# Patient Record
Sex: MALE | Race: ASIAN | NOT HISPANIC OR LATINO | Employment: OTHER | URBAN - METROPOLITAN AREA
[De-identification: names, ages, dates, MRNs, and addresses within clinical notes are randomized per-mention and may not be internally consistent; named-entity substitution may affect disease eponyms.]

---

## 2018-02-14 ENCOUNTER — RECORDS - HEALTHEAST (OUTPATIENT)
Dept: LAB | Facility: CLINIC | Age: 78
End: 2018-02-14

## 2018-02-14 LAB
ALBUMIN SERPL-MCNC: 3.8 G/DL (ref 3.5–5)
ALP SERPL-CCNC: 74 U/L (ref 45–120)
ALT SERPL W P-5'-P-CCNC: 17 U/L (ref 0–45)
ANION GAP SERPL CALCULATED.3IONS-SCNC: 7 MMOL/L (ref 5–18)
AST SERPL W P-5'-P-CCNC: 15 U/L (ref 0–40)
BILIRUB SERPL-MCNC: 1 MG/DL (ref 0–1)
BUN SERPL-MCNC: 30 MG/DL (ref 8–28)
CALCIUM SERPL-MCNC: 8.8 MG/DL (ref 8.5–10.5)
CHLORIDE BLD-SCNC: 104 MMOL/L (ref 98–107)
CHOLEST SERPL-MCNC: 111 MG/DL
CO2 SERPL-SCNC: 26 MMOL/L (ref 22–31)
CREAT SERPL-MCNC: 1.63 MG/DL (ref 0.7–1.3)
FASTING STATUS PATIENT QL REPORTED: ABNORMAL
GFR SERPL CREATININE-BSD FRML MDRD: 41 ML/MIN/1.73M2
GLUCOSE BLD-MCNC: 458 MG/DL (ref 70–125)
HDLC SERPL-MCNC: 36 MG/DL
LDLC SERPL CALC-MCNC: 55 MG/DL
POTASSIUM BLD-SCNC: 5.2 MMOL/L (ref 3.5–5)
PROT SERPL-MCNC: 7.2 G/DL (ref 6–8)
SODIUM SERPL-SCNC: 137 MMOL/L (ref 136–145)
TRIGL SERPL-MCNC: 98 MG/DL

## 2019-01-03 ENCOUNTER — RECORDS - HEALTHEAST (OUTPATIENT)
Dept: LAB | Facility: CLINIC | Age: 79
End: 2019-01-03

## 2019-01-03 LAB
ANION GAP SERPL CALCULATED.3IONS-SCNC: 11 MMOL/L (ref 5–18)
BUN SERPL-MCNC: 24 MG/DL (ref 8–28)
CALCIUM SERPL-MCNC: 9.5 MG/DL (ref 8.5–10.5)
CHLORIDE BLD-SCNC: 104 MMOL/L (ref 98–107)
CHOLEST SERPL-MCNC: 133 MG/DL
CO2 SERPL-SCNC: 26 MMOL/L (ref 22–31)
CREAT SERPL-MCNC: 1.54 MG/DL (ref 0.7–1.3)
FASTING STATUS PATIENT QL REPORTED: ABNORMAL
GFR SERPL CREATININE-BSD FRML MDRD: 44 ML/MIN/1.73M2
GLUCOSE BLD-MCNC: 290 MG/DL (ref 70–125)
HDLC SERPL-MCNC: 36 MG/DL
LDLC SERPL CALC-MCNC: 40 MG/DL
POTASSIUM BLD-SCNC: 4.4 MMOL/L (ref 3.5–5)
SODIUM SERPL-SCNC: 141 MMOL/L (ref 136–145)
TRIGL SERPL-MCNC: 285 MG/DL

## 2019-09-09 ENCOUNTER — TRANSFERRED RECORDS (OUTPATIENT)
Dept: HEALTH INFORMATION MANAGEMENT | Facility: CLINIC | Age: 79
End: 2019-09-09

## 2019-09-09 ENCOUNTER — OFFICE VISIT (OUTPATIENT)
Dept: PHARMACY | Facility: PHYSICIAN GROUP | Age: 79
End: 2019-09-09
Payer: COMMERCIAL

## 2019-09-09 VITALS
HEIGHT: 62 IN | WEIGHT: 141 LBS | HEART RATE: 80 BPM | DIASTOLIC BLOOD PRESSURE: 60 MMHG | SYSTOLIC BLOOD PRESSURE: 114 MMHG | BODY MASS INDEX: 25.95 KG/M2

## 2019-09-09 DIAGNOSIS — E11.8 TYPE 2 DIABETES MELLITUS WITH COMPLICATION, UNSPECIFIED WHETHER LONG TERM INSULIN USE: Primary | ICD-10-CM

## 2019-09-09 DIAGNOSIS — E78.5 HYPERLIPIDEMIA LDL GOAL <100: ICD-10-CM

## 2019-09-09 DIAGNOSIS — M54.9 BACK PAIN, UNSPECIFIED BACK LOCATION, UNSPECIFIED BACK PAIN LATERALITY, UNSPECIFIED CHRONICITY: ICD-10-CM

## 2019-09-09 DIAGNOSIS — I10 BENIGN ESSENTIAL HYPERTENSION: ICD-10-CM

## 2019-09-09 PROCEDURE — 99607 MTMS BY PHARM ADDL 15 MIN: CPT | Performed by: PHARMACIST

## 2019-09-09 PROCEDURE — 99605 MTMS BY PHARM NP 15 MIN: CPT | Performed by: PHARMACIST

## 2019-09-09 RX ORDER — METFORMIN HYDROCHLORIDE 750 MG/1
750 TABLET, EXTENDED RELEASE ORAL 2 TIMES DAILY WITH MEALS
COMMUNITY

## 2019-09-09 RX ORDER — LOSARTAN POTASSIUM 25 MG/1
12.5 TABLET ORAL DAILY
COMMUNITY
End: 2022-08-29

## 2019-09-09 RX ORDER — GLIPIZIDE 10 MG/1
10 TABLET ORAL
COMMUNITY
End: 2022-04-04 | Stop reason: ALTCHOICE

## 2019-09-09 RX ORDER — ASPIRIN 81 MG/1
81 TABLET ORAL DAILY
COMMUNITY

## 2019-09-09 RX ORDER — PRAVASTATIN SODIUM 20 MG
20 TABLET ORAL DAILY
COMMUNITY

## 2019-09-09 RX ORDER — ACETAMINOPHEN 500 MG
1000 TABLET ORAL EVERY 6 HOURS PRN
COMMUNITY
End: 2022-04-04

## 2019-09-09 ASSESSMENT — MIFFLIN-ST. JEOR: SCORE: 1233.82
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
## 2019-09-09 NOTE — PATIENT INSTRUCTIONS
Recommendations from today's MTM visit:                                                    MTM (medication therapy management) is a service provided by a clinical pharmacist designed to help you get the most of out of your medicines.   Today we reviewed what your medicines are for, how to know if they are working, that your medicines are safe and how to make your medicine regimen as easy as possible.     1)  I'm going to talk to Dr. Soni about this first, but I'm recommending that we decrease the Metformin 500 mg tablets to one tablet twice.  Dee is currently taking two tablets twice daily, but we may reduce this to one tablet twice daily    2)  If we do change the metformin, we will likely need to increase the dose of Lantus to 22 units twice daily.  I will let you know once I confirm this with Dr. Soni    It was great to speak with you today.  I value your experience and would be very thankful for your time with providing feedback on our clinic survey. You may receive a survey via email or text message in the next few days.     Next MTM visit: 6 weeks, after appointment with Dr. Soni    To schedule another MTM appointment, please call the clinic directly or you may call the MTM scheduling line at [phone] or toll-free at [phone].     My Clinical Pharmacist's contact information:                                                      It was a pleasure talking with you today!  Please feel free to contact me with any questions or concerns you have.      Magy Medina, PharmD  Medication Therapy Management Pharmacist  Pager: [phone]           Medication List           Accurate as of 9/9/19 11:23 AM. If you have any questions, ask your nurse or doctor.               CONTINUE taking these medications      Morning Afternoon Evening Bedtime   acetaminophen 500 MG tablet  Also known as:  TYLENOL  Take 1,000 mg by mouth every 6 hours as needed for mild pain                  aspirin 81 MG EC  tablet  Take 81 mg by mouth daily              glipiZIDE 10 MG tablet  Also known as:  GLUCOTROL  Take 10 mg by mouth 2 times daily (before meals)        1 tablet with food      1 tablet with food      insulin glargine 100 UNIT/ML pen  Also known as:  LANTUS PEN  Inject 20 Units Subcutaneous 2 times daily  Doctor's comments:  If Lantus is not covered by insurance, may substitute Basaglar at same dose and frequency.                  losartan 25 MG tablet  Also known as:  COZAAR  Take 25 mg by mouth daily              metFORMIN 500 MG 24 hr tablet  Also known as:  GLUCOPHAGE-XR  Take 1,000 mg by mouth daily (with dinner)        2 tablets with food      2 tablets with food      pravastatin 20 MG tablet  Also known as:  PRAVACHOL  Take 20 mg by mouth daily

## 2020-01-20 ENCOUNTER — RECORDS - HEALTHEAST (OUTPATIENT)
Dept: LAB | Facility: CLINIC | Age: 80
End: 2020-01-20

## 2020-01-20 LAB
ANION GAP SERPL CALCULATED.3IONS-SCNC: 10 MMOL/L (ref 5–18)
BUN SERPL-MCNC: 30 MG/DL (ref 8–28)
CALCIUM SERPL-MCNC: 9.7 MG/DL (ref 8.5–10.5)
CHLORIDE BLD-SCNC: 102 MMOL/L (ref 98–107)
CHOLEST SERPL-MCNC: 154 MG/DL
CO2 SERPL-SCNC: 26 MMOL/L (ref 22–31)
CREAT SERPL-MCNC: 1.79 MG/DL (ref 0.7–1.3)
FASTING STATUS PATIENT QL REPORTED: ABNORMAL
GFR SERPL CREATININE-BSD FRML MDRD: 37 ML/MIN/1.73M2
GLUCOSE BLD-MCNC: 448 MG/DL (ref 70–125)
HDLC SERPL-MCNC: 39 MG/DL
LDLC SERPL CALC-MCNC: 63 MG/DL
POTASSIUM BLD-SCNC: 5.4 MMOL/L (ref 3.5–5)
SODIUM SERPL-SCNC: 138 MMOL/L (ref 136–145)
TRIGL SERPL-MCNC: 260 MG/DL

## 2020-12-09 ENCOUNTER — RECORDS - HEALTHEAST (OUTPATIENT)
Dept: LAB | Facility: CLINIC | Age: 80
End: 2020-12-09

## 2020-12-09 LAB
ANION GAP SERPL CALCULATED.3IONS-SCNC: 12 MMOL/L (ref 5–18)
BUN SERPL-MCNC: 27 MG/DL (ref 8–28)
CALCIUM SERPL-MCNC: 9.3 MG/DL (ref 8.5–10.5)
CHLORIDE BLD-SCNC: 102 MMOL/L (ref 98–107)
CO2 SERPL-SCNC: 24 MMOL/L (ref 22–31)
CREAT SERPL-MCNC: 1.86 MG/DL (ref 0.7–1.3)
GFR SERPL CREATININE-BSD FRML MDRD: 35 ML/MIN/1.73M2
GLUCOSE BLD-MCNC: 451 MG/DL (ref 70–125)
POTASSIUM BLD-SCNC: 4.8 MMOL/L (ref 3.5–5)
SODIUM SERPL-SCNC: 138 MMOL/L (ref 136–145)

## 2020-12-23 ENCOUNTER — RECORDS - HEALTHEAST (OUTPATIENT)
Dept: LAB | Facility: CLINIC | Age: 80
End: 2020-12-23

## 2020-12-23 LAB
ANION GAP SERPL CALCULATED.3IONS-SCNC: 8 MMOL/L (ref 5–18)
BUN SERPL-MCNC: 31 MG/DL (ref 8–28)
CALCIUM SERPL-MCNC: 9.2 MG/DL (ref 8.5–10.5)
CHLORIDE BLD-SCNC: 106 MMOL/L (ref 98–107)
CO2 SERPL-SCNC: 27 MMOL/L (ref 22–31)
CREAT SERPL-MCNC: 1.71 MG/DL (ref 0.7–1.3)
GFR SERPL CREATININE-BSD FRML MDRD: 39 ML/MIN/1.73M2
GLUCOSE BLD-MCNC: 249 MG/DL (ref 70–125)
POTASSIUM BLD-SCNC: 5.1 MMOL/L (ref 3.5–5)
SODIUM SERPL-SCNC: 141 MMOL/L (ref 136–145)

## 2021-03-10 ENCOUNTER — RECORDS - HEALTHEAST (OUTPATIENT)
Dept: LAB | Facility: CLINIC | Age: 81
End: 2021-03-10

## 2021-03-10 LAB
CHOLEST SERPL-MCNC: 150 MG/DL
FASTING STATUS PATIENT QL REPORTED: ABNORMAL
HDLC SERPL-MCNC: 33 MG/DL
LDLC SERPL CALC-MCNC: 68 MG/DL
TRIGL SERPL-MCNC: 243 MG/DL

## 2021-09-22 ENCOUNTER — LAB REQUISITION (OUTPATIENT)
Dept: LAB | Facility: CLINIC | Age: 81
End: 2021-09-22

## 2021-09-22 DIAGNOSIS — E11.9 TYPE 2 DIABETES MELLITUS WITHOUT COMPLICATIONS (H): ICD-10-CM

## 2021-09-22 LAB
ANION GAP SERPL CALCULATED.3IONS-SCNC: 11 MMOL/L (ref 5–18)
BUN SERPL-MCNC: 24 MG/DL (ref 8–28)
CALCIUM SERPL-MCNC: 9.3 MG/DL (ref 8.5–10.5)
CHLORIDE BLD-SCNC: 104 MMOL/L (ref 98–107)
CO2 SERPL-SCNC: 25 MMOL/L (ref 22–31)
CREAT SERPL-MCNC: 1.66 MG/DL (ref 0.7–1.3)
CREAT UR-MCNC: 65 MG/DL
GFR SERPL CREATININE-BSD FRML MDRD: 38 ML/MIN/1.73M2
GLUCOSE BLD-MCNC: 363 MG/DL (ref 70–125)
MICROALBUMIN UR-MCNC: 3.65 MG/DL (ref 0–1.99)
MICROALBUMIN/CREAT UR: 56.2 MG/G CR
POTASSIUM BLD-SCNC: 4.5 MMOL/L (ref 3.5–5)
SODIUM SERPL-SCNC: 140 MMOL/L (ref 136–145)

## 2021-09-22 PROCEDURE — 36415 COLL VENOUS BLD VENIPUNCTURE: CPT | Performed by: FAMILY MEDICINE

## 2021-09-22 PROCEDURE — 80048 BASIC METABOLIC PNL TOTAL CA: CPT | Performed by: FAMILY MEDICINE

## 2021-09-22 PROCEDURE — 82043 UR ALBUMIN QUANTITATIVE: CPT | Performed by: FAMILY MEDICINE

## 2021-12-14 ENCOUNTER — TRANSFERRED RECORDS (OUTPATIENT)
Dept: HEALTH INFORMATION MANAGEMENT | Facility: CLINIC | Age: 81
End: 2021-12-14

## 2021-12-14 LAB — RETINOPATHY: NORMAL

## 2022-03-23 ENCOUNTER — TRANSFERRED RECORDS (OUTPATIENT)
Dept: HEALTH INFORMATION MANAGEMENT | Facility: CLINIC | Age: 82
End: 2022-03-23
Payer: COMMERCIAL

## 2022-03-23 ENCOUNTER — LAB REQUISITION (OUTPATIENT)
Dept: LAB | Facility: CLINIC | Age: 82
End: 2022-03-23

## 2022-03-23 DIAGNOSIS — E11.9 TYPE 2 DIABETES MELLITUS WITHOUT COMPLICATIONS (H): ICD-10-CM

## 2022-03-23 DIAGNOSIS — E78.2 MIXED HYPERLIPIDEMIA: ICD-10-CM

## 2022-03-23 LAB
ALBUMIN SERPL-MCNC: 3.8 G/DL (ref 3.5–5)
ALP SERPL-CCNC: 78 U/L (ref 45–120)
ALT SERPL W P-5'-P-CCNC: 13 U/L (ref 0–45)
ANION GAP SERPL CALCULATED.3IONS-SCNC: 13 MMOL/L (ref 5–18)
AST SERPL W P-5'-P-CCNC: 16 U/L (ref 0–40)
BILIRUB SERPL-MCNC: 0.9 MG/DL (ref 0–1)
BUN SERPL-MCNC: 26 MG/DL (ref 8–28)
CALCIUM SERPL-MCNC: 8.9 MG/DL (ref 8.5–10.5)
CHLORIDE BLD-SCNC: 102 MMOL/L (ref 98–107)
CHOLEST SERPL-MCNC: 114 MG/DL
CO2 SERPL-SCNC: 22 MMOL/L (ref 22–31)
CREAT SERPL-MCNC: 1.75 MG/DL (ref 0.7–1.3)
GFR SERPL CREATININE-BSD FRML MDRD: 38 ML/MIN/1.73M2
GLUCOSE BLD-MCNC: 378 MG/DL (ref 70–125)
HBA1C MFR BLD: 10 % (ref 4.2–6.1)
HDLC SERPL-MCNC: 30 MG/DL
LDLC SERPL CALC-MCNC: 48 MG/DL
POTASSIUM BLD-SCNC: 5 MMOL/L (ref 3.5–5)
PROT SERPL-MCNC: 6.8 G/DL (ref 6–8)
SODIUM SERPL-SCNC: 137 MMOL/L (ref 136–145)
TRIGL SERPL-MCNC: 181 MG/DL

## 2022-03-23 PROCEDURE — 80053 COMPREHEN METABOLIC PANEL: CPT | Performed by: FAMILY MEDICINE

## 2022-03-23 PROCEDURE — 80061 LIPID PANEL: CPT | Performed by: FAMILY MEDICINE

## 2022-04-01 ENCOUNTER — OFFICE VISIT (OUTPATIENT)
Dept: PHARMACY | Facility: PHYSICIAN GROUP | Age: 82
End: 2022-04-01
Payer: COMMERCIAL

## 2022-04-01 VITALS — SYSTOLIC BLOOD PRESSURE: 130 MMHG | DIASTOLIC BLOOD PRESSURE: 64 MMHG | HEART RATE: 72 BPM

## 2022-04-01 DIAGNOSIS — Z79.4 TYPE 2 DIABETES MELLITUS WITHOUT COMPLICATION, WITH LONG-TERM CURRENT USE OF INSULIN (H): Primary | ICD-10-CM

## 2022-04-01 DIAGNOSIS — Z78.9 TAKES DIETARY SUPPLEMENTS: ICD-10-CM

## 2022-04-01 DIAGNOSIS — K59.00 CONSTIPATION, UNSPECIFIED CONSTIPATION TYPE: ICD-10-CM

## 2022-04-01 DIAGNOSIS — G62.9 NEUROPATHY: ICD-10-CM

## 2022-04-01 DIAGNOSIS — I11.9 HYPERTENSIVE HEART DISEASE WITHOUT HEART FAILURE: ICD-10-CM

## 2022-04-01 DIAGNOSIS — E11.9 TYPE 2 DIABETES MELLITUS WITHOUT COMPLICATION, WITH LONG-TERM CURRENT USE OF INSULIN (H): Primary | ICD-10-CM

## 2022-04-01 DIAGNOSIS — E78.5 HYPERLIPIDEMIA LDL GOAL <100: ICD-10-CM

## 2022-04-01 PROCEDURE — 99607 MTMS BY PHARM ADDL 15 MIN: CPT | Performed by: PHARMACIST

## 2022-04-01 PROCEDURE — 99605 MTMS BY PHARM NP 15 MIN: CPT | Performed by: PHARMACIST

## 2022-04-01 NOTE — LETTER
April 4, 2022  Dee Sheikh  [address] [place] [address]  [place] [facility] [address]    Dear Mr. Sheikh, [facility]        Thank you for talking with me on Apr 1, 2022 about your health and medications. As a follow-up to our conversation, I have included two documents:      1. Your Recommended To-Do List has steps you should take to get the best results from your medications.  2. Your Medication List will help you keep track of your medications and how to take them.    If you want to talk about these documents, please call Magy Medina RPH at phone: [phone], Monday-Friday 8-4:30pm.    I look forward to working with you and your doctors to make sure your medications work well for you.    Sincerely,    Magy Medina RPH  [facility] Pharmacist, [facility]

## 2022-04-01 NOTE — LETTER
_  Medication List        Prepared on: 4/4/2022     Bring your Medication List when you go to the doctor, hospital, or   emergency room. And, share it with your family or caregivers.     Note any changes to how you take your medications.  Cross out medications when you no longer use them.    Medication How I take it Why I use it Prescriber   acetaminophen (ACETAMINOPHEN 8 HOUR) 650 MG CR tablet Take 650 mg by mouth every 8 hours as needed for pain Pain Cathy Caballero MD   aspirin 81 MG EC tablet Take 81 mg by mouth daily  To Prevent Heart Attack and Stroke Cathy Caballero MD   dulaglutide (TRULICITY) 0.75 MG/0.5ML pen Inject 0.75 mg Subcutaneous every 7 days Type 2 Diabetes Cathy Caballero MD   gabapentin (NEURONTIN) 100 MG capsule Take 100 mg by mouth 3 times daily Neuropathic Pain Cathy Caballero MD   insulin glargine (LANTUS PEN) 100 UNIT/ML pen Inject 30 Units Subcutaneous 2 times daily  Type 2 Diabetes Cathy Caballero MD   losartan (COZAAR) 25 MG tablet Take 12.5 mg by mouth daily  High Blood Pressure  Cathy Caballero MD   metFORMIN (GLUCOPHAGE-XR) 750 MG 24 hr tablet Take 750 mg by mouth 2 times daily (with meals)  Type 2 Diabetes Cathy Caballero MD   Multiple Vitamins-Minerals (MULTIVITAMIN ADULTS) Take 1 tablet by mouth daily General Health Cathy Caballero MD   polyethylene glycol (MIRALAX) 17 GM/Dose powder Take 1 capful by mouth daily as needed for constipation  Constipation Cathy Caballero MD   pravastatin (PRAVACHOL) 20 MG tablet Take 20 mg by mouth daily  High Cholesterol Cathy Caballero MD         Add new medications, over-the-counter drugs, herbals, vitamins, or  minerals in the blank rows below.    Medication How I take it Why I use it Prescriber                          Allergies:      No Known Allergies        Side effects I have had:              Other Information:             My notes and questions:

## 2022-04-01 NOTE — PROGRESS NOTES
Medication Therapy Management (MTM) Encounter    ASSESSMENT:                            Medication Adherence/Access: No issues identified    Type 2 Diabetes: Patient is not meeting A1c goal of < 8%. Self monitoring of blood glucose is not at goal of fasting  mg/dL. Patient would benefit from starting Trulicity as discussed with dr. Caballero.  With the recent GI upset, nausea, and vomiting, discussed that he should wait 3-5 days until feeling better and then can start Trulicity.  At that time he will stop glipizide.  He agrees to this and will let us know if nausea/vomiting still occurring.  Would benefit from CGM, didn't have time to discuss today.    Hypertension: Stable. Patient is meeting blood pressure goal of < 140/90mmHg.    Hyperlipidemia: Patient is not on moderate intensity statin which is indicated based on 2019 ACC/AHA guidelines for lipid management.  His LDL was 48 last time, wouldn't recommend increasing the pravastatin dose or changing medications.    Neuropathy:  Stable.  Increase in symptoms likely from high A1c.    Constipation:  Stable.    Supplements/OTCs: Stable.    PLAN:                            1.  Education provided on how to use the Trulicity injection.  He will start in a few days when nausea/vomiting resolved.  Will stop glipizide at that time.    2.  I will refill metformin.    3.  Discussed trying to drink Pedialyte  ml every 2-4 times daily as able.  If unable to keep down or severe nausea continues should call us back.     Follow-up: Return in 19 days (on 4/20/2022) for MTM Follow Up, Diabetes Follow Up.    SUBJECTIVE/OBJECTIVE:                          Dee Sheikh is a 82 year old male called for an initial visit. He was referred to me from Dr. Caballero. Patient was accompanied by  Valentine.     Reason for visit: Initial medication review. Patient coming in today to learn how to use the Trulicity injection.      Allergies/ADRs: Reviewed in chart  Past Medical History:  Reviewed in chart  Tobacco: He reports that he has never smoked. He does not have any smokeless tobacco history on file.  Alcohol: not currently using    Medication Adherence/Access: no issues reported  Has nurse that set up his medications into a pillbox, he takes it how she sets it up. Rarely forgets doses.    Fills medications at [facility] pharmacy in [place].    HPI:  Pt reports he hasn't felt good the last few days.  Pt felt very dehydrated, headaches, feeling a bit sick, was vomiting yesterday.  Today he feels a bit nauseated and dizzy but hasn't vomited.  Has been able to drink water today.    Type 2 Diabetes:  Currently taking Lantus 30 units twice daily, Glipizide 10 mg once daily (will be stopping once Trulicity started), and Metformin  mg twice daily. Patient is not experiencing side effects.  Blood sugar monitorin time(s) daily. Ranges (patient reported):   BG this morning 238  Am fasting usually 170 or higher  (didn't have glucometer with)  Symptoms of low blood sugar? shaky, dizzy, weak, hungry Frequency of lows- rare  Symptoms of high blood sugar? fatigue, nausea and vomiting  Eye exam: up to date  Foot exam: up to date  Diet/Exercise: Not discussed in detail due to time (apt not scheduled for long enough visit.)  Aspirin: Taking 81mg daily and denies side effects  Statin: Yes   ACEi/ARB: Yes.   Urine Albumin: elevated, UTD  Last A1c was 10%    Hypertension: Current medications include Losartan 12.5 mg daily.  Patient does not self-monitor blood pressure.  Patient reports no current medication side effects.  BP Readings from Last 3 Encounters:   22 130/64   19 114/60     Hyperlipidemia: Current therapy includes Pravastatin 20 mg daily.  Patient reports no significant myalgias or other side effects.    Recent Labs   Lab Test 22  0928 03/10/21  0937   CHOL 114 150   HDL 30* 33*   LDL 48 68   TRIG 181* 243*     Neuropathy: Taking Gabapentin 100 mg twice daily, and  sometimes uses the third dose midday if needed for pain.  Said he has been noticing a bit more tingling/numbness in his feet lately.  The gabapentin does help a lot.  Denies feeling groggy or dizzy after doses.    Constipation:  Takes miralax 17 g daily, reports this is helpful.  Having more regular bowel movements.    Supplements/OTCs: Taking multivitamin daily. No issues with this. Also uses acetaminophen  mg as needed for pain.  Not discussed in detail.      Today's Vitals: /64   Pulse 72   ----------------      I spent 30 minutes with this patient today (an extra 15 minutes was spent creating the Medication Action Plan). All changes were made via collaborative practice agreement with No primary care provider on file.. A copy of the visit note was provided to the patient's provider(s).    The patient was given a summary of these recommendations.     Magy Medina, PharmD  Medication Therapy Management Pharmacist  Pager: [phone]     Medication Therapy Recommendations  Type 2 diabetes mellitus without complication, with long-term current use of insulin (H)    Current Medication: dulaglutide (TRULICITY) 0.75 MG/0.5ML pen   Rationale: Does not understand instructions - Adherence - Adherence   Recommendation: Provide Education   Status: Accepted per CPA

## 2022-04-01 NOTE — LETTER
"Recommended To-Do List      Prepared on: 4/4/2022     You can get the best results from your medications by completing the items on this \"To-Do List.\"      Bring your To-Do List when you go to your doctor. And, share it with your family or caregivers.    My To-Do List:  What we talked about: What I should do:   We reviewed how to use the Trulicity pen.    1.  Once you are not having nausea/vomting you can start Trulicity.  I would recommend waiting 3-4 days until feeling better.  Then yo can start Trulicity 0.75 mg once weekly.  Stop glipizide once you start Trulicity.    2.  I sent in a refill for the metformin.    3.   Try to drink Pedialyte  ml every 2-4 times daily as able. Start with small sips, and increase if able.  If unable to keep down or having severe nausea/vomiting continues should call us back or go to the ER over the weekend.      Follow-up: Return in 19 days (on 4/20/2022) for MTM Follow Up, Diabetes Follow Up.            What we talked about: What I should do:                     "

## 2022-04-04 RX ORDER — POLYETHYLENE GLYCOL 3350 17 G/17G
1 POWDER, FOR SOLUTION ORAL DAILY PRN
COMMUNITY

## 2022-04-04 RX ORDER — GABAPENTIN 100 MG/1
100 CAPSULE ORAL 3 TIMES DAILY
COMMUNITY

## 2022-04-04 RX ORDER — SENNOSIDES 8.6 MG
650 CAPSULE ORAL EVERY 8 HOURS PRN
COMMUNITY

## 2022-04-20 ENCOUNTER — OFFICE VISIT (OUTPATIENT)
Dept: PHARMACY | Facility: PHYSICIAN GROUP | Age: 82
End: 2022-04-20
Payer: COMMERCIAL

## 2022-04-20 DIAGNOSIS — Z79.4 TYPE 2 DIABETES MELLITUS WITHOUT COMPLICATION, WITH LONG-TERM CURRENT USE OF INSULIN (H): Primary | ICD-10-CM

## 2022-04-20 DIAGNOSIS — E11.9 TYPE 2 DIABETES MELLITUS WITHOUT COMPLICATION, WITH LONG-TERM CURRENT USE OF INSULIN (H): Primary | ICD-10-CM

## 2022-04-20 PROCEDURE — 99607 MTMS BY PHARM ADDL 15 MIN: CPT | Performed by: PHARMACIST

## 2022-04-20 PROCEDURE — 99606 MTMS BY PHARM EST 15 MIN: CPT | Performed by: PHARMACIST

## 2022-04-20 NOTE — PROGRESS NOTES
Medication Therapy Management (MTM) Encounter    ASSESSMENT:                            Medication Adherence/Access: See below    Difficulty Hearing (right ear):  Confirmed that the fluticasone nasal spray was covered, and it's being shipped to him as of 4/21.  Discussed that he should NOT put the fluticasone nasal spray into his ear.  The nasal spray he is using at home should also NOT be put into his ear.       Type 2 Diabetes: Patient is not meeting A1c goal of < 8%. Self monitoring of blood glucose is not at goal of fasting  mg/dL but improving.  Has been tolerating Trulicity well, BG are coming down very quickly and not having some hypoglycemia overnight.  Would benefit from reducin his Lantus dose.  OK to continue the lower dose just at bedtime since this is what he's been taking the last month and BG seem to be improving. We may be able to titrate his dose of Trulicity, and get off Lantus completely.  Would benefit from CGM, He would like to get the Amina monitor.       PLAN:                            1.  Decrease Lantus to 25 units at bedtime.    2.  Start Freestyle Amina 2 reader and sensors.  Sending prescription in.  We will review how to use this on 5/4.    I checked with the pharmacy, and they shipped the Fluticasone nasal spray on 4/21 so you will get this soon.    Follow-up: Return in 2 weeks (on 5/4/2022) for MTM Follow Up, Diabetes Follow Up.    SUBJECTIVE/OBJECTIVE:                          Dee Sheikh is a 82 year old male coming in for a follow-up visit. Patient was accompanied by an . Today's visit is a follow-up MTM visit from 4/1/22     Reason for visit: Follow up on diabetes and new start Trulicity.    Allergies/ADRs: Reviewed in chart  Past Medical History: Reviewed in chart  Tobacco: He reports that he has never smoked. He does not have any smokeless tobacco history on file.  Alcohol: not currently using     Medication Adherence/Access: no issues reported  Has nurse that set  up his medications into a pillbox, he takes it how she sets it up. Rarely forgets doses.    Fills medications at [facility] pharmacy in [place].    HPI:  Patient went to [facility] ED on 4/3 for nausea, vomiting and dizziness.  They felt his N/V was due to hyperglycemia.  His BG there was extremely high.  He hadn't been able to take his oral DM meds which likely exacerbated the hyperglycemia.     Difficulty Hearing (right ear): On the  or 4/3 he started having issues hearing our of right ear.  That was around the time he was very nauseated and had emesis.  He saw Dr. Shelby on .  He hasn't been able to start the fluticasone nasal spray that Dr. Shelby prescribed   He did get a nasal spray at [facility], but isn't helping. Doesn't know what kind of nasal spray it is, has been using it for 4 days.  He is using it in his nose and ears.     Type 2 Diabetes:  Currently taking Lantus 30 units at bedtime (was on BID), Trulicity 0.75 mg weekly (on for last 3 weeks), and Metformin  mg twice daily.  His blood sugars have been coming down a lot in the last month.  Opt said someone told him to decrease his Lantus to once daily at bedtime when he started Trulicity.  I don't know who told him this, I didn't and Dr. Caballero didn't say to decrease his insulin.  We had stopped the glipizide, but didn't have intention to reduce lantus too.  Looking at the [facility] ED note, they have the wrong insulin dosing listed for him.  It's possible they told him to continue lantus 30 units at bedtime thinking this was his PTA dose.     Discussed CGMs, he would love to get one.  Should be covered by insurance.  Patient is not experiencing side effects.  Blood sugar monitorin time(s) daily. Ranges (patient reported):   BG AM fasting 180, 191, 130 today.  Had one morning it was 68.  Had a few AM readings between 85-90  (didn't have glucometer with)  Symptoms of low blood sugar? shaky, dizzy, weak, hungry  Frequency of lows- has a few in the last month.  2 times,   Symptoms of high blood sugar? fatigue, nausea and vomiting  Eye exam: up to date  Foot exam: up to date  Diet/Exercise: Not discussed in detail due to time (apt not scheduled for long enough visit.)  Aspirin: Taking 81mg daily and denies side effects  Statin: Yes   ACEi/ARB: Yes.   Urine Albumin: elevated, UTD  Last A1c was 10%      Today's Vitals: /64   Pulse 67   ----------------  Post Discharge Medication Reconciliation Status: discharge medications reconciled and changed, per note/orders.    I spent 40 minutes with this patient today. All changes were made via collaborative practice agreement with No primary care provider on file.. A copy of the visit note was provided to the patient's provider(s).    The patient was mailed a summary of these recommendations.     Magy Medina, PharmD  Medication Therapy Management Pharmacist  Pager: [phone]       Medication Therapy Recommendations  Type 2 diabetes mellitus without complication, with long-term current use of insulin (H)    Current Medication: blood glucose monitoring (NO BRAND SPECIFIED) meter device kit   Rationale: More effective medication available - Ineffective medication - Effectiveness   Recommendation: Change Medication   Status: Accepted per CPA   Note: Freestyle Amina 2          Current Medication: insulin glargine (LANTUS PEN) 100 UNIT/ML pen   Rationale: Dose too high - Dosage too high - Safety   Recommendation: Decrease Dose   Status: Accepted per CPA

## 2022-04-22 VITALS — HEART RATE: 67 BPM | DIASTOLIC BLOOD PRESSURE: 64 MMHG | SYSTOLIC BLOOD PRESSURE: 122 MMHG

## 2022-04-22 RX ORDER — FLUTICASONE PROPIONATE 50 MCG
1 SPRAY, SUSPENSION (ML) NASAL DAILY
COMMUNITY

## 2022-04-22 NOTE — PATIENT INSTRUCTIONS
"Recommendations from today's MTM visit:                                                    MTM (medication therapy management) is a service provided by a clinical pharmacist designed to help you get the most of out of your medicines.   Today we reviewed what your medicines are for, how to know if they are working, that your medicines are safe and how to make your medicine regimen as easy as possible.      1. Decrease Lantus to 25 units at bedtime.    2. Start Freestyle Amina 2 reader and sensors.  Sending prescription in.  We will review how to use this on 5/4.    I checked with the pharmacy, and they shipped the Fluticasone nasal spray on 4/21 so you will get this soon.    Follow-up: Return in 2 weeks (on 5/4/2022) for MTM Follow Up, Diabetes Follow Up.    It was great speaking with you today.  I value your experience and would be very thankful for your time in providing feedback in our clinic survey. In the next few days, you may receive an email or text message from "[employer] with a link to a survey related to your  clinical pharmacist.\"     To schedule another MTM appointment, please call the clinic directly or you may call the MTM scheduling line at [phone] or toll-free at [phone].     My Clinical Pharmacist's contact information:                                                      Please feel free to contact me with any questions or concerns you have.      Magy Medina, Sonia  Medication Therapy Management Pharmacist  Pager: [phone]     "

## 2022-05-04 ENCOUNTER — OFFICE VISIT (OUTPATIENT)
Dept: PHARMACY | Facility: PHYSICIAN GROUP | Age: 82
End: 2022-05-04
Payer: COMMERCIAL

## 2022-05-04 ENCOUNTER — LAB REQUISITION (OUTPATIENT)
Dept: LAB | Facility: CLINIC | Age: 82
End: 2022-05-04
Payer: COMMERCIAL

## 2022-05-04 ENCOUNTER — LAB REQUISITION (OUTPATIENT)
Dept: LAB | Facility: CLINIC | Age: 82
End: 2022-05-04

## 2022-05-04 ENCOUNTER — TRANSFERRED RECORDS (OUTPATIENT)
Dept: HEALTH INFORMATION MANAGEMENT | Facility: CLINIC | Age: 82
End: 2022-05-04

## 2022-05-04 VITALS
SYSTOLIC BLOOD PRESSURE: 102 MMHG | DIASTOLIC BLOOD PRESSURE: 54 MMHG | WEIGHT: 140.9 LBS | HEART RATE: 88 BPM | BODY MASS INDEX: 25.93 KG/M2 | HEIGHT: 62 IN

## 2022-05-04 DIAGNOSIS — J02.9 ACUTE PHARYNGITIS, UNSPECIFIED: ICD-10-CM

## 2022-05-04 DIAGNOSIS — E46 PROTEIN-CALORIE MALNUTRITION, UNSPECIFIED SEVERITY (H): ICD-10-CM

## 2022-05-04 DIAGNOSIS — Z79.4 TYPE 2 DIABETES MELLITUS WITHOUT COMPLICATION, WITH LONG-TERM CURRENT USE OF INSULIN (H): Primary | ICD-10-CM

## 2022-05-04 DIAGNOSIS — H91.91 HEARING DIFFICULTY OF RIGHT EAR: ICD-10-CM

## 2022-05-04 DIAGNOSIS — E11.9 TYPE 2 DIABETES MELLITUS WITHOUT COMPLICATION, WITH LONG-TERM CURRENT USE OF INSULIN (H): Primary | ICD-10-CM

## 2022-05-04 LAB — HBA1C MFR BLD: 8.9 % (ref 4.2–6.1)

## 2022-05-04 PROCEDURE — 87077 CULTURE AEROBIC IDENTIFY: CPT | Performed by: FAMILY MEDICINE

## 2022-05-04 PROCEDURE — 99606 MTMS BY PHARM EST 15 MIN: CPT | Performed by: PHARMACIST

## 2022-05-04 PROCEDURE — U0003 INFECTIOUS AGENT DETECTION BY NUCLEIC ACID (DNA OR RNA); SEVERE ACUTE RESPIRATORY SYNDROME CORONAVIRUS 2 (SARS-COV-2) (CORONAVIRUS DISEASE [COVID-19]), AMPLIFIED PROBE TECHNIQUE, MAKING USE OF HIGH THROUGHPUT TECHNOLOGIES AS DESCRIBED BY CMS-2020-01-R: HCPCS | Mod: ORL | Performed by: FAMILY MEDICINE

## 2022-05-04 NOTE — PROGRESS NOTES
Medication Therapy Management (MTM) Encounter    ASSESSMENT:                            Medication Adherence/Access: No issues identified    Difficulty Hearing (right ear):  Hearing has improved slightly, but hasn't been on the fluticasone long.  The cough, sore throat, and sputum production could be related to allergies.  These symptoms may continue to improve in the next few weeks, but encouraged him to call us if they don't.  Could consider referral to ENT/angiologist for hearing concern.  Dizziness likely related to hearing issue, could also be lower blood pressure today.  Will ask MA to recheck blood pressure as well.    Malnutrition:  Would benefit from additional help from care coordinator.  He still hasn't been able to get the Ensure, so unsure if his [facility] coordinator got Mayela's request for help with calling pharmacies to see where it could be ordered.  If the [facility] coordinator hasn't been able to find out, Mayela could call pharmacies/medical supply companies.     Type 2 Diabetes: Patient is not meeting A1c goal of < 8%. Self monitoring of blood glucose is at goal of fasting  mg/dL.  He is checking his A1c again at his appointment today with Dr. Caballero, will defer medication changes to her visit.  Could continue increasing the dose of Trulicity if needed.    PLAN:                            1.  Continue current medications.  Seeing Dr. Caballero after for A1c and medication changes if needed.    Follow-up: as needed - Patient would like to wait 3 months before adjusting medications.  He will continue follow up with Dr. Caballero, and we can schedule follow up in between visits if needed or having difficulty getting A1c down.    After my appointment with patient Dr. Caballero stopped by to discuss medication changes.  His A1c was 8.9%, significantly improved but still high.  Discussed increasing Trulicity and decreasing Lantus dose again-Dr. Caballero will review with him at her visit.  We would like to do a phone  follow up with me in a couple weeks, I will call him.  He just didn't want to come back in to so frequently for visits in between PCP appointments.    Update 5/4 6 PM:  Dee declined changing medications, wants to wait until his follow up with Dr. Caballero in 6 weeks.  Follow up with me not needed at this time.  Dr. Caballero will let me know if further MTM follow up needed.      SUBJECTIVE/OBJECTIVE:                          Dee Sheikh is a 82 year old male coming in for a follow-up visit. Patient was accompanied by  Valentine. Patient is seeing provider after our visit today. Today's visit is a follow-up MTM visit from 4/20/2022     Reason for visit: Diabetes follow up.  We were planning to review how to use the kyler monitor today but it wasn't covered.    Allergies/ADRs: Reviewed in chart  Past Medical History: Reviewed in chart  Tobacco: He reports that he has never smoked. He has never used smokeless tobacco.  Alcohol: not currently using    Medication Adherence/Access: no issues reported    Difficulty Hearing (right ear): Taking fluticasone nasal spray 1 spray daily.  Started this 4 days ago, thinks he's had some improvement in his hearing.  On the 4/2 or 4/3 he started having issues hearing out of right ear.  That was around the time he was very nauseated and had emesis.  He went to the hospital at that time for emesis, which was related to hyperglycemia.  He saw Dr. Shelby on 4/14 for hearing issue.    He also reports sinus congestion, sputum production, cough and sore throat.  Will let Dr. Caballero know, she may want to test for influenza/COVID.  These symptoms have been occurring the last month.    Malnutrition:  Still hasn't been able to get the Ensure.  Mayela was going to ask his  to help him find a pharmacy that can order the specific brand.  He has been buying the OTC ensure that has sugar in it (7g), [facility] had it.  He didn't notice significant change in glucose readings.  Has been using it  "for the last month and didn't notice an increase in his blood sugars.     Type 2 Diabetes:  Currently taking Lantus 30 units at bedtime, Trulicity 0.75 mg weekly (on for last 4 weeks), and Metformin  mg twice daily.  At our last visit we had reduced his Lantus to 25 units due to hypoglycemia.  He noticed his blood glucose go up a lot afterwards, so resumed the previous dose of 30 units daily.  Denies hypoglycemia the last few weeks. We tried getting a kyler monitor but found out CGMs are not covered.  He has a MA/Medicare combined plan so it follows the medicare rules for CGMs.    Denies nausea/vomiting since his hospital discharge/starting trulicity.  Is still having dizziness, feels a bit off balance when walking.  Thinks it's related to the newer hearing issues.  Blood sugar monitorin time(s) daily. Ranges (patient reported):   Morning fasting blood glucose: 144 today, usually around 130-140.  (didn't have glucometer with)  Symptoms of low blood sugar? shaky, dizzy, weak, hungry Frequency of lows- none in the last month  Symptoms of high blood sugar? fatigue, improving  Eye exam: up to date  Foot exam: up to date  Aspirin: Taking 81 mg daily and denies side effects  Statin: Yes   ACEi/ARB: Yes.   Urine Albumin: elevated, up to date.  On max tolerated dose of losartan.  Abstracted A1c result-  Lab Results   Component Value Date    60857 10.0 (A) 2022       Today's Vitals: /54   Pulse 88   Ht 5' 2\" (1.575 m)   Wt 140 lb 14.4 oz (63.9 kg)   BMI 25.77 kg/m    ----------------  Post Discharge Medication Reconciliation Status: discharge medications reconciled and changed, per note/orders.    I spent 45 minutes with this patient today. I offer these suggestions for consideration by Dr. Caballero. A copy of the visit note was provided to the patient's provider(s).    The patient declined a summary of these recommendations.     Magy Medina, PharmD  Medication Therapy Management " Pharmacist  Pager: [phone]     Medication Therapy Recommendations  No medication therapy recommendations to display

## 2022-05-05 LAB — SARS-COV-2 RNA RESP QL NAA+PROBE: POSITIVE

## 2022-05-07 LAB — BACTERIA SPEC CULT: ABNORMAL

## 2022-05-25 ENCOUNTER — VIRTUAL VISIT (OUTPATIENT)
Dept: PHARMACY | Facility: PHYSICIAN GROUP | Age: 82
End: 2022-05-25
Payer: COMMERCIAL

## 2022-05-25 DIAGNOSIS — E11.9 TYPE 2 DIABETES MELLITUS WITHOUT COMPLICATION, WITH LONG-TERM CURRENT USE OF INSULIN (H): Primary | ICD-10-CM

## 2022-05-25 DIAGNOSIS — Z79.4 TYPE 2 DIABETES MELLITUS WITHOUT COMPLICATION, WITH LONG-TERM CURRENT USE OF INSULIN (H): Primary | ICD-10-CM

## 2022-05-25 DIAGNOSIS — U07.1 INFECTION DUE TO 2019 NOVEL CORONAVIRUS: ICD-10-CM

## 2022-05-25 PROCEDURE — 99606 MTMS BY PHARM EST 15 MIN: CPT | Performed by: PHARMACIST

## 2022-05-25 NOTE — PROGRESS NOTES
Medication Therapy Management (MTM) Encounter    ASSESSMENT:                            Medication Adherence/Access: Will ask care coordinator or MA if they can call [facility] for him to check on status of the prescription for Ensure, and to make sure they got the prescription.  He would like an update after they call [employer].    Recent COVID-19 infection:  He said his cough is a bit better, still feels like his head feels heavy and has tinnitus still.  He did a home COVID test yesterday and it was negative.  Denies cough, shortness of breath, sinus congestion.  He said the muscle relaxer medication has been helping with the heavy head feeling.  It helps with/ his neck and shoulder pain.  He said he is using it 2-3 times daily depending on his neck and head pain and it's helping a lot.  I think this is the gabapentin, he said it's a capsule.      Type 2 Diabetes:  Currently taking Lantus 30 units at bedtime, Trulicity 0.75 mg weekly (on for last 4 weeks), and Metformin  mg twice daily.  At our last     PLAN:                            1. Will ask care coordinator to call [facility], and see if the prescription for Ensure needs to be refaxed or if they need a different product.  Patient requests a call back to be updated on this    Follow-up: Return in about 3 months (around 8/25/2022) for MTM Follow Up.    SUBJECTIVE/OBJECTIVE:                          Dee Sheikh is a 82 year old male called for a follow-up visit. Patient was accompanied by . Today's visit is a follow-up MTM visit from 6/15     Reason for visit: Follow up on diabetes and symptoms of COVID infection.    Allergies/ADRs: Reviewed in chart  Past Medical History: Reviewed in chart  Tobacco: He reports that he has never smoked. He has never used smokeless tobacco.  Alcohol: not currently using    Medication Adherence/Access:  Unable to do medication rec during phone visit, he doesn't know the names of the medications but knows  what they are for.  Has a nurse who helps him set up his medications into the pillbox.  He said he needs another prescription for Ensure, said they didn't have refills.  Looks like we sent prescription to [facility].  Let   He feels better when he drinks Ensure, has more energy.  Usually drinks 2 per day.  Let him know the prescription was sent to [facility], their phone number is [phone].  Pt went to the [facility] and they told him they either didn't have the product in stock or didn't get the prescription that was sent.  He isn't sure because they didn't have an .        Recent COVID-19 infection:  He said his cough is a bit better, still feels like his head feels heavy and has tinnitus still.  He did a home COVID test yesterday and it was negative.  Denies cough, shortness of breath, sinus congestion.  He said the muscle relaxer medication has been helping with the heavy head feeling.  It helps with/ his neck and shoulder pain.  He said he is using it 2-3 times daily depending on his neck and head pain and it's helping a lot.  I think this is the gabapentin, he said it's a capsule.      Type 2 Diabetes:  Currently taking Lantus 30 units at bedtime, Trulicity 0.75 mg weekly (on for last 4 weeks), and Metformin  mg twice daily.  At our last visit we didn't adjust his diabetes medications due to improvement in A1c.  Denies hypoglycemia the last few weeks, lowest was 75. He thinks the Trulicity has been helping a lot with his blood glucose readings.  He has noticed decreased appetite with the Trulicity, doesn't feel like eating as much and has helped with reducing portions.  It makes him have no appetite.   Blood sugar monitorin-2 time(s) daily. Ranges (patient reported):   Morning fasting blood glucose: usually around 120-140.  He did see a few lower readings between 75-90 but not often.    Symptoms of low blood sugar? shaky, dizzy, weak, hungry   Frequency of lows- none in the  last month  Symptoms of high blood sugar? fatigue, improving  Eye exam: up to date  Foot exam: up to date  Aspirin: Taking 81 mg daily and denies side effects  Statin: Yes   ACEi/ARB: Yes.   Urine Albumin: elevated, up to date.  On max tolerated dose of losartan.  Abstracted A1c result-  Lab Results   Component Value Date    08782 8.9 (A) 05/04/2022     ----------------      I spent 30 minutes with this patient today. I offer these suggestions for consideration by Dr. Caballero. A copy of the visit note was provided to the patient's provider(s).    The patient declined a summary of these recommendations.     Vero FrancoD  Medication Therapy Management Pharmacist  Pager: [phone]      Telemedicine Visit Details  Type of service:  Telephone visit  Start Time: 1:00 PM  End Time:1:30 PM  Originating Location (patient location): [place]  Distant Location (provider location):  [facility]     Medication Therapy Recommendations  No medication therapy recommendations to display

## 2022-07-26 ENCOUNTER — LAB REQUISITION (OUTPATIENT)
Dept: LAB | Facility: CLINIC | Age: 82
End: 2022-07-26

## 2022-07-26 DIAGNOSIS — R55 SYNCOPE AND COLLAPSE: ICD-10-CM

## 2022-07-26 PROCEDURE — 84443 ASSAY THYROID STIM HORMONE: CPT | Performed by: NURSE PRACTITIONER

## 2022-07-26 PROCEDURE — 84439 ASSAY OF FREE THYROXINE: CPT | Performed by: NURSE PRACTITIONER

## 2022-07-26 PROCEDURE — 80053 COMPREHEN METABOLIC PANEL: CPT | Performed by: NURSE PRACTITIONER

## 2022-07-27 LAB
ALBUMIN SERPL BCG-MCNC: 4.7 G/DL (ref 3.5–5.2)
ALP SERPL-CCNC: 77 U/L (ref 40–129)
ALT SERPL W P-5'-P-CCNC: 17 U/L (ref 10–50)
ANION GAP SERPL CALCULATED.3IONS-SCNC: 12 MMOL/L (ref 7–15)
AST SERPL W P-5'-P-CCNC: 22 U/L (ref 10–50)
BILIRUB SERPL-MCNC: 0.7 MG/DL
BUN SERPL-MCNC: 14.9 MG/DL (ref 8–23)
CALCIUM SERPL-MCNC: 9.5 MG/DL (ref 8.8–10.2)
CHLORIDE SERPL-SCNC: 100 MMOL/L (ref 98–107)
CREAT SERPL-MCNC: 1.3 MG/DL (ref 0.67–1.17)
DEPRECATED HCO3 PLAS-SCNC: 24 MMOL/L (ref 22–29)
GFR SERPL CREATININE-BSD FRML MDRD: 55 ML/MIN/1.73M2
GLUCOSE SERPL-MCNC: 209 MG/DL (ref 70–99)
POTASSIUM SERPL-SCNC: 4.8 MMOL/L (ref 3.4–5.3)
PROT SERPL-MCNC: 7 G/DL (ref 6.4–8.3)
SODIUM SERPL-SCNC: 136 MMOL/L (ref 136–145)
T4 FREE SERPL-MCNC: 1.59 NG/DL (ref 0.9–1.7)
TSH SERPL DL<=0.005 MIU/L-ACNC: 1.25 UIU/ML (ref 0.3–4.2)

## 2022-08-12 ENCOUNTER — TRANSFERRED RECORDS (OUTPATIENT)
Dept: HEALTH INFORMATION MANAGEMENT | Facility: CLINIC | Age: 82
End: 2022-08-12

## 2022-08-12 LAB — HBA1C MFR BLD: 8.8 % (ref 4.2–6.1)

## 2022-08-25 NOTE — PROGRESS NOTES
Medication Therapy Management (MTM) Encounter    ASSESSMENT:                            Medication Adherence/Access: No issues identified     Type 2 Diabetes: Patient is not meeting A1c goal of < 8%. Self monitoring of blood glucose is at goal of fasting  mg/dL.  Would benefit from increasing his glipizide dose.  His blood glucose have increased significantly since stopping Trulicity, is also feeling better.  We could consider trying alternative GLP1 agonist in the future if needed, low dose victoza or Ozempic 0.25 mg might be options.  He would also be a candidate for a SGLT2 inhibitor, or may be able to titrate his lantus dose up further if needed.  Will be due next month for microalbumin.  We could consider stopping aspirin given age and using for primary prevention.    PLAN:                            1.  Increase Glipizide ER to 10 mg twice daily before meals.    2.  Consider stopping aspirin due to age and used for primary prevention.    Follow-up: Return in 2 weeks (on 9/9/2022) for MTM Follow Up.       SUBJECTIVE/OBJECTIVE:                          Dee Sheikh is a 82 year old male coming in for a follow-up visit. Patient was accompanied by . Today's visit is a follow-up MTM visit from 5/25/22     Reason for visit: Follow up on diabetes and to confirm which medications he is taking.    Allergies/ADRs: Reviewed in chart  Past Medical History: Reviewed in chart  Tobacco: He reports that he has never smoked. He has never used smokeless tobacco.  Alcohol: not currently using    Medication Adherence/Access: no issues reported  Patient uses pill box(es).  Has a home healthcare RN that helps him set up his medications.    Type 2 Diabetes:  Currently taking Lantus 30 units once daily at bedtime, Glipizide 10 mg in the morning before breakfast, Metformin  mg twice daily.  Confirmed that he is only taking Lantus ONCE daily, at his primary care provider visit it was thought he might be taking it  twice daily.  He is feeling a lot better since stopping Trulicity, said his appetite is back and not having GI upset.  He is worried about his high BG readings since stopping it and wondering if he should start it again. The Glipizide hasn't been helping much.  Patient is not experiencing side effects.  Blood sugar monitorin time(s) daily. Ranges (patient reported):   AM fasting- 292, 327, 236, 363, 262, 173, 167, 86,   Symptoms of low blood sugar? shaky, dizzy, weak, Frequency of lows- none recently  Symptoms of high blood sugar? polyuria, fatigue, tingling and numbness since stopping trulicity.  Eye exam: up to date, 2021  Foot exam: up to date  Diet/Exercise: Usually only eats two meals per day.  He also drinks a diabetic ensure once daily, said it helps with his energy levels and feels better when he uses it.  Aspirin: Taking 81mg daily for primary prevention  and denies side effects   Statin: Yes  ACEi/ARB: No, blood pressure was too low  Urine Albumin: elevated, due 2022  Last A1c was 8.8%  Abstracted A1c result-  Lab Results   Component Value Date    21390 8.9 (A) 2022     Today's Vitals: /72   Pulse 74   Wt 136 lb (61.7 kg)   BMI 24.87 kg/m    ----------------    I spent 40 minutes with this patient today. All changes were made via collaborative practice agreement with No primary care provider on file.. A copy of the visit note was provided to the patient's provider(s)    The patient was mailed a summary of these recommendations.     Magy Medina, PharmD  Medication Therapy Management Pharmacist  Pager: [phone]       Medication Therapy Recommendations  Type 2 diabetes mellitus without complication, with long-term current use of insulin (H)    Current Medication: aspirin 81 MG EC tablet   Rationale: No medical indication at this time - Unnecessary medication therapy - Indication   Recommendation: Discontinue Medication   Status: Contact Provider - Awaiting Response           Current Medication: glipiZIDE (GLUCOTROL XL) 10 MG 24 hr tablet   Rationale: Dose too low - Dosage too low - Effectiveness   Recommendation: Increase Dose   Status: Accepted per CPA

## 2022-08-26 ENCOUNTER — OFFICE VISIT (OUTPATIENT)
Dept: PHARMACY | Facility: PHYSICIAN GROUP | Age: 82
End: 2022-08-26
Payer: COMMERCIAL

## 2022-08-26 VITALS
DIASTOLIC BLOOD PRESSURE: 72 MMHG | SYSTOLIC BLOOD PRESSURE: 126 MMHG | HEART RATE: 74 BPM | WEIGHT: 136 LBS | BODY MASS INDEX: 24.87 KG/M2

## 2022-08-26 DIAGNOSIS — Z79.4 TYPE 2 DIABETES MELLITUS WITHOUT COMPLICATION, WITH LONG-TERM CURRENT USE OF INSULIN (H): Primary | ICD-10-CM

## 2022-08-26 DIAGNOSIS — E11.9 TYPE 2 DIABETES MELLITUS WITHOUT COMPLICATION, WITH LONG-TERM CURRENT USE OF INSULIN (H): Primary | ICD-10-CM

## 2022-08-26 PROCEDURE — 99607 MTMS BY PHARM ADDL 15 MIN: CPT | Performed by: PHARMACIST

## 2022-08-26 PROCEDURE — 99606 MTMS BY PHARM EST 15 MIN: CPT | Performed by: PHARMACIST

## 2022-08-26 NOTE — Clinical Note
Hello,  I was wondering if someone could help abstract in the date of this patient's diabetic eye exam?  He self reported that he had his diabetic eye exam on 12/14/2021.    If there is a specific dot phrase I should be using to request this could you let me know?  Thanks, Magy Medina, PharmD Medication Therapy Management Pharmacist Pager: [phone]

## 2022-08-29 RX ORDER — GLIPIZIDE 10 MG/1
10 TABLET, FILM COATED, EXTENDED RELEASE ORAL
COMMUNITY
Start: 2022-08-29

## 2022-08-29 NOTE — PATIENT INSTRUCTIONS
"Recommendations from today's MTM visit:                                                         1.  Increase Glipizide ER to 10 mg twice daily before meals.    Follow-up: Return in 2 weeks (on 9/9/2022) for MTM Follow Up.    It was great speaking with you today.  I value your experience and would be very thankful for your time in providing feedback in our clinic survey. In the next few days, you may receive an email or text message from [employer] with a link to a survey related to your  clinical pharmacist.\"     To schedule another MTM appointment, please call the clinic directly or you may call the MTM scheduling line at [phone] or toll-free at [phone].     My Clinical Pharmacist's contact information:                                                      Please feel free to contact me with any questions or concerns you have.      Magy Medina, PharmD  Medication Therapy Management Pharmacist  Pager: [phone]     "

## 2022-09-09 ENCOUNTER — OFFICE VISIT (OUTPATIENT)
Dept: PHARMACY | Facility: PHYSICIAN GROUP | Age: 82
End: 2022-09-09
Payer: COMMERCIAL

## 2022-09-09 VITALS
WEIGHT: 140.4 LBS | HEART RATE: 75 BPM | DIASTOLIC BLOOD PRESSURE: 74 MMHG | BODY MASS INDEX: 25.68 KG/M2 | SYSTOLIC BLOOD PRESSURE: 122 MMHG

## 2022-09-09 DIAGNOSIS — Z79.4 TYPE 2 DIABETES MELLITUS WITHOUT COMPLICATION, WITH LONG-TERM CURRENT USE OF INSULIN (H): Primary | ICD-10-CM

## 2022-09-09 DIAGNOSIS — E11.9 TYPE 2 DIABETES MELLITUS WITHOUT COMPLICATION, WITH LONG-TERM CURRENT USE OF INSULIN (H): Primary | ICD-10-CM

## 2022-09-09 PROCEDURE — 99606 MTMS BY PHARM EST 15 MIN: CPT | Performed by: PHARMACIST

## 2022-09-09 NOTE — PROGRESS NOTES
Medication Therapy Management (MTM) Encounter    ASSESSMENT:                            Medication Adherence/Access: No issues identified    Right ear hearing loss:  Plan in place to see ENT, referral sent.  He needs help calling to schedule appointment for hearing aids.    Type 2 Diabetes: Patient is not meeting A1c goal of < 8%. Self monitoring of blood glucose is at goal of fasting  mg/dL and post prandial < 180 mg/dL.  Blood glucose improving a lot with increase in glipizide, will continue current medications.  Has follow up to recheck A1c in November with Dr. Shelby.  I will call him in 1 month to check on home blood glucose.    PLAN:                            1.  Continue current medications.  2.  Asked Jada to help them schedule an appointment with the audiology clinic for hearing aids.  She met with Dee and helped him schedule this in October.    Follow-up: Return in about 6 weeks (around 10/18/2022) for MTM Follow Up.    SUBJECTIVE/OBJECTIVE:                          Dee Sheikh is a 82 year old male coming in for a follow-up visit. Patient was accompanied by his wife, and we called a phone . Today's visit is a follow-up MTM visit from 8/26/2022     Reason for visit: Follow up on diabetes.  Patient had some questions about his hearing.    Allergies/ADRs: Reviewed in chart  Past Medical History: Reviewed in chart  Tobacco: He reports that he has never smoked. He has never used smokeless tobacco.  Alcohol: not currently using  Social: Going on trip to [place] in September    Medication Adherence/Access: no issues reported  Patient uses pill box(es).  Has a home healthcare RN that helps him set up his medications.    Right ear hearing loss:  Lost hearing in his right ear after his hospitalization a few months ago.  He saw an audiologist, who also recommended he see an ENT (referral has been sent).  They also requested medical clearance from primary care provider, which the form was signed  and faxed back to them.  He hasn't gotten a call yet to set up the appointment to get hearing aids.      Type 2 Diabetes:  Currently taking Lantus 30 units once daily at bedtime, Glipizide 10 mg twice daily, Metformin  mg twice daily.  He is feeling a lot better since stopping Trulicity, said his appetite is back and not having GI upset.  He is worried about his high BG readings since stopping it (were in the 200-300's).  We increased the glipizide dose. Pt said his blood glucose are a lot better since we increased the glipizide. Said he is feeling a lot better since we last met.    Patient is not experiencing side effects.  Blood sugar monitorin time(s) daily. Ranges (patient reported):   AM fasting- 161 yesterday, 191, 182, 161, 162.  None about 200 since we increased glipizide  Evening 164,   Symptoms of low blood sugar? shaky, dizzy, weak, Frequency of lows- none recently, lowest number was 79.    Symptoms of high blood sugar? polyuria, fatigue, tingling and numbness since stopping trulicity.  Eye exam: up to date, 2021  Foot exam: up to date  Diet/Exercise: Usually only eats two meals per day.  He also drinks a diabetic ensure once daily, said it helps with his energy levels and feels better when he uses it.  Aspirin: Taking 81mg daily for primary prevention  and denies side effects   Statin: Yes  ACEi/ARB: No, blood pressure was too low  Urine Albumin: elevated, due 2022  Last A1c was 8.8%    Today's Vitals: /74   Pulse 75   Wt 140 lb 6.4 oz (63.7 kg)   BMI 25.68 kg/m    ----------------      I spent 50 minutes with this patient today. All changes were made via collaborative practice agreement with No primary care provider on file.. A copy of the visit note was provided to the patient's provider(s).    The patient given a summary of these recommendations.     Magy Medina PharmD  Medication Therapy Management Pharmacist  Pager: [phone]     Medication Therapy  Recommendations  No medication therapy recommendations to display

## 2022-11-14 ENCOUNTER — TRANSFERRED RECORDS (OUTPATIENT)
Dept: HEALTH INFORMATION MANAGEMENT | Facility: CLINIC | Age: 82
End: 2022-11-14

## 2022-11-14 LAB — HBA1C MFR BLD: 8 % (ref 4.2–6.1)

## 2022-11-30 ENCOUNTER — OFFICE VISIT (OUTPATIENT)
Dept: PHARMACY | Facility: PHYSICIAN GROUP | Age: 82
End: 2022-11-30
Payer: COMMERCIAL

## 2022-11-30 VITALS
BODY MASS INDEX: 25.44 KG/M2 | WEIGHT: 139.1 LBS | SYSTOLIC BLOOD PRESSURE: 108 MMHG | OXYGEN SATURATION: 97 % | HEART RATE: 79 BPM | DIASTOLIC BLOOD PRESSURE: 64 MMHG

## 2022-11-30 DIAGNOSIS — Z79.4 TYPE 2 DIABETES MELLITUS WITHOUT COMPLICATION, WITH LONG-TERM CURRENT USE OF INSULIN (H): Primary | ICD-10-CM

## 2022-11-30 DIAGNOSIS — E11.9 TYPE 2 DIABETES MELLITUS WITHOUT COMPLICATION, WITH LONG-TERM CURRENT USE OF INSULIN (H): Primary | ICD-10-CM

## 2022-11-30 PROCEDURE — 99606 MTMS BY PHARM EST 15 MIN: CPT | Performed by: PHARMACIST

## 2022-11-30 NOTE — PROGRESS NOTES
Medication Therapy Management (MTM) Encounter    ASSESSMENT:                            Medication Adherence/Access: No issues identified    Type 2 Diabetes: Patient is meeting A1c goal of < 8%. Self monitoring of blood glucose is at goal of fasting  mg/dL, reviewed blood glucose goals with patient.  Blood glucose improved a lot with increase in glipizide, will continue current medications.  Has a couple lower blood glucose in the morning.  Discussed that we could reduce his insulin dose a little bit, but he would prefer to keep the same.  He will make sure he has a snack before bedtime if he had a small/light dinner.  He would benefit from getting a continuous glucose monitor, but looking back in April the Amina was denied because he isn't on 3 or more insulin injections per day.  This seems odd because Medicare guidelines are still following the relaxed guidelines of 1 or more insulin per day.  I will try to get this again for him to confirm.  His next primary care provider visit is in February.     PLAN:                            1.  Continue current medications.  Please call us if your blood sugars are going too low (below 70)    2.  Could consider stopping aspirin due to age and used for primary prevention.    Will try getting the Amina monitor at [facility].    Follow-up: Return in about 3 months (around 2/23/2023).    SUBJECTIVE/OBJECTIVE:                          Dee Sheikh is a 82 year old male coming in for a follow-up visit. Patient was accompanied by his son, and  Rahul. Today's visit is a follow-up MTM visit from 9/9/2022     Reason for visit: Follow up on diabetes.    Allergies/ADRs: Reviewed in chart  Past Medical History: Reviewed in chart  Tobacco: He reports that he has never smoked. He has never used smokeless tobacco.  Alcohol: not currently using  Social: Going on trip to [place] in September     Medication Adherence/Access: no issues reported  Patient uses pill  box(avril).  Has a home healthcare RN that helps him set up his medications.     Type 2 Diabetes:  Currently taking Lantus 30 units once daily at bedtime, Glipizide 10 mg twice daily, Metformin  mg twice daily.  He is feeling a lot better since stopping Trulicity, said his appetite is back and not having GI upset.   Patient is not experiencing side effects.  Blood sugar monitorin time(s) daily. Ranges (patient reported):   AM fasting- ranges a lot, mostly runs between 120-150's.  Did have 3-4 AM readings this last month around 72-84.  He woke up and felt a little shaky, but eating helped it a lot.  None below 70.  Symptoms of low blood sugar? shaky, dizzy, weak, hungry. Frequency of lows-none below 70.  Starts to feel symptoms if in the 70's.  Typically feels hungry if in the low 100's  Symptoms of high blood sugar? polyuria, fatigue, tingling and numbness since stopping trulicity.  Eye exam: up to date, 2021  Foot exam: up to date  Diet/Exercise: Usually only eats two meals per day.  He also drinks a diabetic ensure once daily, said it helps with his energy levels and feels better when he uses it.  Aspirin: Taking 81mg daily for primary prevention  and denies side effects   Statin: Yes  ACEi/ARB: No, blood pressure was too low  Urine Albumin: elevated, due 2022  Last A1c was 8%!!! Great improvement    Today's Vitals: /64   Pulse 79   Wt 139 lb 1.6 oz (63.1 kg)   SpO2 97%   BMI 25.44 kg/m    ----------------    I spent 45 minutes with this patient today. All changes were made via collaborative practice agreement with Cathy Caballero MD. A copy of the visit note was provided to the patient's provider(s).    A summary of these recommendations was declined by the patient.    Magy Medina, Sonia  Medication Therapy Management Pharmacist  Pager: [phone]     Medication Therapy Recommendations  No medication therapy recommendations to display

## 2023-02-17 ENCOUNTER — LAB REQUISITION (OUTPATIENT)
Dept: LAB | Facility: CLINIC | Age: 83
End: 2023-02-17

## 2023-02-17 ENCOUNTER — TRANSFERRED RECORDS (OUTPATIENT)
Dept: HEALTH INFORMATION MANAGEMENT | Facility: CLINIC | Age: 83
End: 2023-02-17

## 2023-02-17 LAB
ALBUMIN SERPL BCG-MCNC: 4.5 G/DL (ref 3.5–5.2)
ALP SERPL-CCNC: 67 U/L (ref 40–129)
ALT SERPL W P-5'-P-CCNC: 17 U/L (ref 10–50)
ANION GAP SERPL CALCULATED.3IONS-SCNC: 12 MMOL/L (ref 7–15)
AST SERPL W P-5'-P-CCNC: 19 U/L (ref 10–50)
BILIRUB SERPL-MCNC: 0.9 MG/DL
BUN SERPL-MCNC: 18.5 MG/DL (ref 8–23)
CALCIUM SERPL-MCNC: 9.7 MG/DL (ref 8.8–10.2)
CHLORIDE SERPL-SCNC: 106 MMOL/L (ref 98–107)
CHOLEST SERPL-MCNC: 127 MG/DL
CREAT SERPL-MCNC: 1.55 MG/DL (ref 0.67–1.17)
DEPRECATED HCO3 PLAS-SCNC: 26 MMOL/L (ref 22–29)
GFR SERPL CREATININE-BSD FRML MDRD: 44 ML/MIN/1.73M2
GLUCOSE SERPL-MCNC: 147 MG/DL (ref 70–99)
HBA1C MFR BLD: 8.2 % (ref 4.2–6.1)
HDLC SERPL-MCNC: 36 MG/DL
LDLC SERPL CALC-MCNC: 55 MG/DL
NONHDLC SERPL-MCNC: 91 MG/DL
POTASSIUM SERPL-SCNC: 4.8 MMOL/L (ref 3.4–5.3)
PROT SERPL-MCNC: 7.3 G/DL (ref 6.4–8.3)
SODIUM SERPL-SCNC: 144 MMOL/L (ref 136–145)
TRIGL SERPL-MCNC: 178 MG/DL

## 2023-02-17 PROCEDURE — 80061 LIPID PANEL: CPT | Performed by: FAMILY MEDICINE

## 2023-02-17 PROCEDURE — 80053 COMPREHEN METABOLIC PANEL: CPT | Performed by: FAMILY MEDICINE

## 2023-05-22 ENCOUNTER — TRANSFERRED RECORDS (OUTPATIENT)
Dept: HEALTH INFORMATION MANAGEMENT | Facility: CLINIC | Age: 83
End: 2023-05-22

## 2023-05-22 LAB — HBA1C MFR BLD: 9.2 % (ref 4.2–6.1)

## 2023-08-28 ENCOUNTER — LAB REQUISITION (OUTPATIENT)
Dept: LAB | Facility: CLINIC | Age: 83
End: 2023-08-28

## 2023-08-28 DIAGNOSIS — N18.32 CHRONIC KIDNEY DISEASE, STAGE 3B (H): ICD-10-CM

## 2023-08-28 DIAGNOSIS — R53.83 OTHER FATIGUE: ICD-10-CM

## 2023-08-28 LAB
ALBUMIN SERPL BCG-MCNC: 4.7 G/DL (ref 3.5–5.2)
ALP SERPL-CCNC: 73 U/L (ref 40–129)
ALT SERPL W P-5'-P-CCNC: 23 U/L (ref 0–70)
ANION GAP SERPL CALCULATED.3IONS-SCNC: 12 MMOL/L (ref 7–15)
AST SERPL W P-5'-P-CCNC: 32 U/L (ref 0–45)
BILIRUB SERPL-MCNC: 0.8 MG/DL
BUN SERPL-MCNC: 30.9 MG/DL (ref 8–23)
CALCIUM SERPL-MCNC: 9.3 MG/DL (ref 8.8–10.2)
CHLORIDE SERPL-SCNC: 106 MMOL/L (ref 98–107)
CREAT SERPL-MCNC: 1.71 MG/DL (ref 0.67–1.17)
DEPRECATED HCO3 PLAS-SCNC: 22 MMOL/L (ref 22–29)
GFR SERPL CREATININE-BSD FRML MDRD: 39 ML/MIN/1.73M2
GLUCOSE SERPL-MCNC: 146 MG/DL (ref 70–99)
POTASSIUM SERPL-SCNC: 5.6 MMOL/L (ref 3.4–5.3)
PROT SERPL-MCNC: 7.4 G/DL (ref 6.4–8.3)
SODIUM SERPL-SCNC: 140 MMOL/L (ref 136–145)
TSH SERPL DL<=0.005 MIU/L-ACNC: 0.75 UIU/ML (ref 0.3–4.2)

## 2023-08-28 PROCEDURE — 80053 COMPREHEN METABOLIC PANEL: CPT | Performed by: FAMILY MEDICINE

## 2023-08-28 PROCEDURE — 84443 ASSAY THYROID STIM HORMONE: CPT | Performed by: FAMILY MEDICINE

## 2023-09-07 ENCOUNTER — LAB REQUISITION (OUTPATIENT)
Dept: LAB | Facility: CLINIC | Age: 83
End: 2023-09-07

## 2023-09-07 DIAGNOSIS — N18.32 CHRONIC KIDNEY DISEASE, STAGE 3B (H): ICD-10-CM

## 2023-09-07 PROCEDURE — 80053 COMPREHEN METABOLIC PANEL: CPT | Performed by: FAMILY MEDICINE

## 2023-09-08 LAB
ALBUMIN SERPL BCG-MCNC: 4.3 G/DL (ref 3.5–5.2)
ALP SERPL-CCNC: 68 U/L (ref 40–129)
ALT SERPL W P-5'-P-CCNC: 26 U/L (ref 0–70)
ANION GAP SERPL CALCULATED.3IONS-SCNC: 11 MMOL/L (ref 7–15)
AST SERPL W P-5'-P-CCNC: 39 U/L (ref 0–45)
BILIRUB SERPL-MCNC: 0.7 MG/DL
BUN SERPL-MCNC: 19.2 MG/DL (ref 8–23)
CALCIUM SERPL-MCNC: 8.9 MG/DL (ref 8.8–10.2)
CHLORIDE SERPL-SCNC: 105 MMOL/L (ref 98–107)
CREAT SERPL-MCNC: 1.5 MG/DL (ref 0.67–1.17)
DEPRECATED HCO3 PLAS-SCNC: 24 MMOL/L (ref 22–29)
EGFRCR SERPLBLD CKD-EPI 2021: 46 ML/MIN/1.73M2
GLUCOSE SERPL-MCNC: 127 MG/DL (ref 70–99)
POTASSIUM SERPL-SCNC: 4.6 MMOL/L (ref 3.4–5.3)
PROT SERPL-MCNC: 6.6 G/DL (ref 6.4–8.3)
SODIUM SERPL-SCNC: 140 MMOL/L (ref 136–145)

## 2023-11-29 ENCOUNTER — LAB REQUISITION (OUTPATIENT)
Dept: LAB | Facility: CLINIC | Age: 83
End: 2023-11-29

## 2023-11-29 DIAGNOSIS — R53.83 OTHER FATIGUE: ICD-10-CM

## 2023-11-29 DIAGNOSIS — D64.9 ANEMIA, UNSPECIFIED: ICD-10-CM

## 2023-11-29 DIAGNOSIS — I49.9 CARDIAC ARRHYTHMIA, UNSPECIFIED: ICD-10-CM

## 2023-11-29 LAB
ANION GAP SERPL CALCULATED.3IONS-SCNC: 14 MMOL/L (ref 7–15)
BUN SERPL-MCNC: 20.1 MG/DL (ref 8–23)
CALCIUM SERPL-MCNC: 9.5 MG/DL (ref 8.8–10.2)
CHLORIDE SERPL-SCNC: 104 MMOL/L (ref 98–107)
CREAT SERPL-MCNC: 1.63 MG/DL (ref 0.67–1.17)
DEPRECATED HCO3 PLAS-SCNC: 22 MMOL/L (ref 22–29)
EGFRCR SERPLBLD CKD-EPI 2021: 42 ML/MIN/1.73M2
FOLATE SERPL-MCNC: 11.6 NG/ML (ref 4.6–34.8)
GLUCOSE SERPL-MCNC: 163 MG/DL (ref 70–99)
IRON BINDING CAPACITY (ROCHE): 269 UG/DL (ref 240–430)
IRON SATN MFR SERPL: 35 % (ref 15–46)
IRON SERPL-MCNC: 93 UG/DL (ref 61–157)
POTASSIUM SERPL-SCNC: 5 MMOL/L (ref 3.4–5.3)
SODIUM SERPL-SCNC: 140 MMOL/L (ref 135–145)
VIT B12 SERPL-MCNC: 372 PG/ML (ref 232–1245)

## 2023-11-29 PROCEDURE — 82746 ASSAY OF FOLIC ACID SERUM: CPT | Performed by: FAMILY MEDICINE

## 2023-11-29 PROCEDURE — 80048 BASIC METABOLIC PNL TOTAL CA: CPT | Performed by: FAMILY MEDICINE

## 2023-11-29 PROCEDURE — 83550 IRON BINDING TEST: CPT | Performed by: FAMILY MEDICINE

## 2023-11-29 PROCEDURE — 82607 VITAMIN B-12: CPT | Performed by: FAMILY MEDICINE

## 2024-02-02 ENCOUNTER — TRANSFERRED RECORDS (OUTPATIENT)
Dept: HEALTH INFORMATION MANAGEMENT | Facility: CLINIC | Age: 84
End: 2024-02-02

## 2024-02-02 ENCOUNTER — LAB REQUISITION (OUTPATIENT)
Dept: LAB | Facility: CLINIC | Age: 84
End: 2024-02-02

## 2024-02-02 DIAGNOSIS — R00.1 BRADYCARDIA, UNSPECIFIED: ICD-10-CM

## 2024-02-02 DIAGNOSIS — E11.22 TYPE 2 DIABETES MELLITUS WITH DIABETIC CHRONIC KIDNEY DISEASE (H): ICD-10-CM

## 2024-02-02 PROCEDURE — 84443 ASSAY THYROID STIM HORMONE: CPT | Performed by: FAMILY MEDICINE

## 2024-02-02 PROCEDURE — 80053 COMPREHEN METABOLIC PANEL: CPT | Performed by: FAMILY MEDICINE

## 2024-02-02 PROCEDURE — 83735 ASSAY OF MAGNESIUM: CPT | Performed by: FAMILY MEDICINE

## 2024-02-03 LAB
ALBUMIN SERPL BCG-MCNC: 4.3 G/DL (ref 3.5–5.2)
ALP SERPL-CCNC: 67 U/L (ref 40–150)
ALT SERPL W P-5'-P-CCNC: 16 U/L (ref 0–70)
ANION GAP SERPL CALCULATED.3IONS-SCNC: 10 MMOL/L (ref 7–15)
AST SERPL W P-5'-P-CCNC: 18 U/L (ref 0–45)
BILIRUB SERPL-MCNC: 0.7 MG/DL
BUN SERPL-MCNC: 27.1 MG/DL (ref 8–23)
CALCIUM SERPL-MCNC: 9.6 MG/DL (ref 8.8–10.2)
CHLORIDE SERPL-SCNC: 102 MMOL/L (ref 98–107)
CREAT SERPL-MCNC: 1.71 MG/DL (ref 0.67–1.17)
DEPRECATED HCO3 PLAS-SCNC: 25 MMOL/L (ref 22–29)
EGFRCR SERPLBLD CKD-EPI 2021: 39 ML/MIN/1.73M2
GLUCOSE SERPL-MCNC: 339 MG/DL (ref 70–99)
MAGNESIUM SERPL-MCNC: 1.9 MG/DL (ref 1.7–2.3)
POTASSIUM SERPL-SCNC: 5.2 MMOL/L (ref 3.4–5.3)
PROT SERPL-MCNC: 7.2 G/DL (ref 6.4–8.3)
SODIUM SERPL-SCNC: 137 MMOL/L (ref 135–145)
TSH SERPL DL<=0.005 MIU/L-ACNC: 0.92 UIU/ML (ref 0.3–4.2)

## 2024-02-21 ENCOUNTER — HOSPITAL ENCOUNTER (EMERGENCY)
Facility: HOSPITAL | Age: 84
Discharge: HOME | End: 2024-02-21
Attending: EMERGENCY MEDICINE | Admitting: EMERGENCY MEDICINE
Payer: COMMERCIAL

## 2024-02-21 VITALS
TEMPERATURE: 98.8 F | SYSTOLIC BLOOD PRESSURE: 133 MMHG | HEART RATE: 75 BPM | DIASTOLIC BLOOD PRESSURE: 70 MMHG | OXYGEN SATURATION: 99 % | RESPIRATION RATE: 21 BRPM

## 2024-02-21 DIAGNOSIS — R00.2 PALPITATIONS: ICD-10-CM

## 2024-02-21 LAB
ANION GAP SERPL CALCULATED.3IONS-SCNC: 6 MMOL/L (ref 7–15)
BUN SERPL-MCNC: 22.3 MG/DL (ref 8–23)
CALCIUM SERPL-MCNC: 9 MG/DL (ref 8.8–10.2)
CHLORIDE SERPL-SCNC: 108 MMOL/L (ref 98–107)
CREAT SERPL-MCNC: 1.51 MG/DL (ref 0.67–1.17)
DEPRECATED HCO3 PLAS-SCNC: 29 MMOL/L (ref 22–29)
EGFRCR SERPLBLD CKD-EPI 2021: 45 ML/MIN/1.73M2
ERYTHROCYTE [DISTWIDTH] IN BLOOD BY AUTOMATED COUNT: 12.9 % (ref 10–15)
GLUCOSE SERPL-MCNC: 145 MG/DL (ref 70–99)
HCT VFR BLD AUTO: 41 % (ref 40–53)
HGB BLD-MCNC: 13.7 G/DL (ref 13.3–17.7)
MAGNESIUM SERPL-MCNC: 1.7 MG/DL (ref 1.7–2.3)
MCH RBC QN AUTO: 27 PG (ref 26.5–33)
MCHC RBC AUTO-ENTMCNC: 33.4 G/DL (ref 31.5–36.5)
MCV RBC AUTO: 81 FL (ref 78–100)
PLATELET # BLD AUTO: 132 10E3/UL (ref 150–450)
POTASSIUM SERPL-SCNC: 4.5 MMOL/L (ref 3.4–5.3)
RBC # BLD AUTO: 5.07 10E6/UL (ref 4.4–5.9)
SODIUM SERPL-SCNC: 143 MMOL/L (ref 135–145)
TROPONIN T SERPL HS-MCNC: 26 NG/L
TROPONIN T SERPL HS-MCNC: 27 NG/L
TSH SERPL DL<=0.005 MIU/L-ACNC: 1.13 UIU/ML (ref 0.3–4.2)
WBC # BLD AUTO: 6.4 10E3/UL (ref 4–11)

## 2024-02-21 PROCEDURE — 80048 BASIC METABOLIC PNL TOTAL CA: CPT | Performed by: EMERGENCY MEDICINE

## 2024-02-21 PROCEDURE — 84443 ASSAY THYROID STIM HORMONE: CPT | Performed by: EMERGENCY MEDICINE

## 2024-02-21 PROCEDURE — 85027 COMPLETE CBC AUTOMATED: CPT | Performed by: EMERGENCY MEDICINE

## 2024-02-21 PROCEDURE — 84484 ASSAY OF TROPONIN QUANT: CPT | Performed by: EMERGENCY MEDICINE

## 2024-02-21 PROCEDURE — 36415 COLL VENOUS BLD VENIPUNCTURE: CPT | Performed by: EMERGENCY MEDICINE

## 2024-02-21 PROCEDURE — 93005 ELECTROCARDIOGRAM TRACING: CPT | Performed by: EMERGENCY MEDICINE

## 2024-02-21 PROCEDURE — 99284 EMERGENCY DEPT VISIT MOD MDM: CPT

## 2024-02-21 PROCEDURE — 83735 ASSAY OF MAGNESIUM: CPT | Performed by: EMERGENCY MEDICINE

## 2024-02-21 ASSESSMENT — ACTIVITIES OF DAILY LIVING (ADL): ADLS_ACUITY_SCORE: 35

## 2024-02-21 NOTE — ED NOTES
Bed: JNEDH-I  Expected date: 2/21/24  Expected time: 9:18 AM  Means of arrival: Ambulance  Comments:  84M: Rubens

## 2024-02-21 NOTE — ED PROVIDER NOTES
"EMERGENCY DEPARTMENT ENCOUNTER      NAME: Dee Sheikh  AGE: 84 year old male  YOB: 1940  MRN: [id number]  EVALUATION DATE & TIME: 2/21/2024  9:23 AM    PCP: Cathy Caballero    ED PROVIDER: Joshua Arevalo M.D.      Chief Complaint   Patient presents with    Bradycardia         FINAL IMPRESSION:  Palpitations      ED COURSE & MEDICAL DECISION MAKING:    Pertinent Labs & Imaging studies reviewed. (See chart for details)  84 year old male presents to the Emergency Department for evaluation of reported low heart rate.  Patient was being seen by home health care nurse reported his heart rate was in the \"20-50 range\".  Patient reports having irregular heartbeat for much of the last month.  States he had a Holter monitor removed yesterday.  Sought evaluation today because of reports of low heart rate.  Denies any chest pain or shortness of breath.  Ports no weakness.  Review of records does not reflect recent Holter monitor.  Most recent hospitalization/visit was on 11/30/2022.  Exam is benign other than for cardiac exam with frequent extrasystole.  Blood pressure also moderately elevated at 197/86.  Will obtain baseline blood work to assess for contributory electrolyte imbalance.  TSH also being obtained.  Initial EKG is reassuring .heart rate on arrival was in the 70s .  patient appears non toxic with stable vitals signs. Overall exam is benign.        9:25 AM I met with the patient for the initial interview and physical examination. Discussed plan for treatment and workup in the ED.    10:56 AM.  Laboratory evaluation essentially unremarkable.  Troponin minimally elevated 27.  TSH normal at 1.13.  Magnesium normal at 1.7.  CBC unremarkable.  Electrolytes with slight renal insufficiency with BUN of 22 creatinine 1.51.  Glucose slightly elevated 145.  Given reports of bradycardia and elevated troponin will obtain delta troponin prior to determining disposition  At 12:30 PM.  Delta troponin unchanged.  Will " proceed with continued outpatient management.  At the conclusion of the encounter I discussed the results of all of the tests and the disposition. The questions were answered and return precautions provided. The patient or family acknowledged understanding and was agreeable with the care plan.       PPE: Provider wore paper mask.     MEDICATIONS GIVEN IN THE EMERGENCY:  Medications - No data to display    NEW PRESCRIPTIONS STARTED AT TODAY'S ER VISIT  New Prescriptions    No medications on file          =================================================================    HPI    Patient information was obtained from: patient and nurse.    Use of Intrepreter: Yes ([employer]) - Language Malharong.        Dee Sheikh is a 84 year old male with a pertient medical history of DM2 who presents to the ED for evaluation of irregular heart rate.    Per patient,  He has been having irregular HR for 1 month. They put him on a heart monitor, in which he sent back yesterday. He states that the home health nurse who comes to him to give him shots found his heart rate irregular on Monday and told him to visit the ED. Denies chest pain, shortness of breath, nausea, and vomiting. No other complaints at this time.     Per nurse,  Home health nurse found that the patient's HR was 28-50, so she told the patient to come to the ED.         REVIEW OF SYSTEMS   Constitutional:  Denies fever, chills  Respiratory:  Denies productive cough or increased work of breathing  Cardiovascular:  Denies chest pain, shortness of breath, or palpitations. Positive for irregular HR.    GI:  Denies abdominal pain, nausea, vomiting, or change in bowel or bladder habits   Musculoskeletal:  Denies any new muscle/joint swelling  Skin:  Denies rash   Neurologic:  Denies focal weakness  All systems negative except as marked.     PAST MEDICAL HISTORY:  History reviewed. No pertinent past medical history.    PAST SURGICAL HISTORY:  Past Surgical History:   Procedure  Laterality Date    NO PAST SURGERIES           CURRENT MEDICATIONS:    No current facility-administered medications for this encounter.    Current Outpatient Medications:     acetaminophen (TYLENOL) 650 MG CR tablet, Take 650 mg by mouth every 8 hours as needed for pain, Disp: , Rfl:     aspirin 81 MG EC tablet, Take 81 mg by mouth daily , Disp: , Rfl:     blood glucose monitoring (NO BRAND SPECIFIED) meter device kit, Use to test blood sugar once daily, Disp: , Rfl:     fluticasone (FLONASE) 50 MCG/ACT nasal spray, Spray 1 spray into both nostrils daily, Disp: , Rfl:     gabapentin (NEURONTIN) 100 MG capsule, Take 100 mg by mouth 3 times daily, Disp: , Rfl:     glipiZIDE (GLUCOTROL XL) 10 MG 24 hr tablet, Take 1 tablet (10 mg) by mouth 2 times daily (before meals), Disp: , Rfl:     insulin glargine (LANTUS PEN) 100 UNIT/ML pen, Inject 30 Units Subcutaneous At Bedtime, Disp: , Rfl:     metFORMIN (GLUCOPHAGE-XR) 750 MG 24 hr tablet, Take 750 mg by mouth 2 times daily (with meals) , Disp: , Rfl:     Multiple Vitamins-Minerals (MULTIVITAMIN ADULTS PO), Take 1 tablet by mouth daily, Disp: , Rfl:     Nutritional Supplements (ENSURE ACTIVE LIGHT) LIQD, Take 1 Bottle by mouth daily, Disp: , Rfl:     polyethylene glycol (MIRALAX) 17 GM/Dose powder, Take 1 capful by mouth daily as needed for constipation , Disp: , Rfl:     pravastatin (PRAVACHOL) 20 MG tablet, Take 20 mg by mouth daily , Disp: , Rfl:     ALLERGIES:  No Known Allergies    FAMILY HISTORY:  History reviewed. No pertinent family history.    SOCIAL HISTORY:   Social History     Socioeconomic History    Marital status:      Spouse name: None    Number of children: None    Years of education: None    Highest education level: None   Tobacco Use    Smoking status: Never    Smokeless tobacco: Never   Vaping Use    Vaping Use: Never used   Substance and Sexual Activity    Alcohol use: No       VITALS:  Patient Vitals for the past 24 hrs:   BP Pulse Resp SpO2    02/21/24 0930 (!) 195/105 85 14 99 %        PHYSICAL EXAM    Constitutional:  Awake, alert, in no apparent distress  HENT:  Normocephalic, Atraumatic. Bilateral external ears normal. Oropharynx moist. Nose normal. Neck- Normal range of motion with no guarding, No midline cervical tenderness, Supple, No stridor.   Eyes:  PERRL, EOMI with no signs of entrapment, Conjunctiva normal, No discharge.   Respiratory:  Normal breath sounds, No respiratory distress, No wheezing.    Cardiovascular: Normal rhythm, No appreciable rubs or gallops. Heart rate with frequency extrasystole.   GI:  Soft, No tenderness, No distension, No palpable masses  Musculoskeletal:  Intact distal pulses, No edema. Good range of motion in all major joints. No tenderness to palpation or major deformities noted.  Integument:  Warm, Dry, No erythema, No rash.   Neurologic:  Alert & oriented, Normal motor function, Normal sensory function, No focal deficits noted.   Psychiatric:  Affect normal, Judgment normal, Mood normal.     LAB:  All pertinent labs reviewed and interpreted.     Results for orders placed or performed during the hospital encounter of 02/21/24   Basic metabolic panel     Status: Abnormal   Result Value Ref Range    Sodium 143 135 - 145 mmol/L    Potassium 4.5 3.4 - 5.3 mmol/L    Chloride 108 (H) 98 - 107 mmol/L    Carbon Dioxide (CO2) 29 22 - 29 mmol/L    Anion Gap 6 (L) 7 - 15 mmol/L    Urea Nitrogen 22.3 8.0 - 23.0 mg/dL    Creatinine 1.51 (H) 0.67 - 1.17 mg/dL    GFR Estimate 45 (L) >60 mL/min/1.73m2    Calcium 9.0 8.8 - 10.2 mg/dL    Glucose 145 (H) 70 - 99 mg/dL   Troponin T, High Sensitivity     Status: Abnormal   Result Value Ref Range    Troponin T, High Sensitivity 27 (H) <=22 ng/L   Magnesium     Status: Normal   Result Value Ref Range    Magnesium 1.7 1.7 - 2.3 mg/dL   TSH with free T4 reflex     Status: Normal   Result Value Ref Range    TSH 1.13 0.30 - 4.20 uIU/mL   CBC (+ platelets, no diff)     Status: Abnormal    Result Value Ref Range    WBC Count 6.4 4.0 - 11.0 10e3/uL    RBC Count 5.07 4.40 - 5.90 10e6/uL    Hemoglobin 13.7 13.3 - 17.7 g/dL    Hematocrit 41.0 40.0 - 53.0 %    MCV 81 78 - 100 fL    MCH 27.0 26.5 - 33.0 pg    MCHC 33.4 31.5 - 36.5 g/dL    RDW 12.9 10.0 - 15.0 %    Platelet Count 132 (L) 150 - 450 10e3/uL   Troponin T, High Sensitivity (now)     Status: Abnormal   Result Value Ref Range    Troponin T, High Sensitivity 26 (H) <=22 ng/L      RADIOLOGY:  Reviewed all pertinent imaging. Please see official radiology report.  No orders to display       EKG:    Normal sinus rhythm with first-degree AV block.  Frequent PVCs.  Normal QRS.  Normal ST segments.  Normal EKG other than frequent PVCs.  No prior tracing for comparison  I have independently reviewed and interpreted the EKG(s) documented above.      I, Gavi Rocha, am serving as a scribe to document services personally performed by Joshua Arevalo MD, based on my observation and the provider's statements to me. I, Joshua Arevalo MD attest that Gavi Rocha is acting in a scribe capacity, has observed my performance of the services and has documented them in accordance with my direction.    Joshua Arevalo M.D.  Emergency Medicine  [facility] EMERGENCY DEPARTMENT     Joshua Arevalo MD  02/21/24 3069

## 2024-02-21 NOTE — ED TRIAGE NOTES
Pt brought in by ems from home. Pt had a home health nurse that checked his hr and found out to be 28 to 50 and called ems. Pt denies any cp and shortness of breath or any complains. Via [employer] interpretor pt reports he had irregular hr for a month and had Holter monitor removed yesterday. Ems given him 200ml of fluid.     Triage Assessment (Adult)       Row Name 02/21/24 0935          Triage Assessment    Airway WDL WDL        Respiratory WDL    Respiratory WDL WDL        Skin Circulation/Temperature WDL    Skin Circulation/Temperature WDL WDL        Cardiac WDL    Cardiac WDL rhythm     Pulse Rate & Regularity apical pulse irregular        Peripheral/Neurovascular WDL    Peripheral Neurovascular WDL WDL        Cognitive/Neuro/Behavioral WDL    Cognitive/Neuro/Behavioral WDL WDL

## 2024-02-23 LAB
ATRIAL RATE - MUSE: 89 BPM
DIASTOLIC BLOOD PRESSURE - MUSE: NORMAL MMHG
INTERPRETATION ECG - MUSE: NORMAL
P AXIS - MUSE: 74 DEGREES
PR INTERVAL - MUSE: 264 MS
QRS DURATION - MUSE: 84 MS
QT - MUSE: 362 MS
QTC - MUSE: 440 MS
R AXIS - MUSE: 35 DEGREES
SYSTOLIC BLOOD PRESSURE - MUSE: NORMAL MMHG
T AXIS - MUSE: 57 DEGREES
VENTRICULAR RATE- MUSE: 89 BPM

## 2024-03-01 ENCOUNTER — TRANSFERRED RECORDS (OUTPATIENT)
Dept: HEALTH INFORMATION MANAGEMENT | Facility: CLINIC | Age: 84
End: 2024-03-01

## 2024-06-07 ENCOUNTER — LAB REQUISITION (OUTPATIENT)
Dept: LAB | Facility: CLINIC | Age: 84
End: 2024-06-07

## 2024-06-07 DIAGNOSIS — E11.65 TYPE 2 DIABETES MELLITUS WITH HYPERGLYCEMIA (H): ICD-10-CM

## 2024-06-07 DIAGNOSIS — E78.2 MIXED HYPERLIPIDEMIA: ICD-10-CM

## 2024-06-07 PROCEDURE — 80048 BASIC METABOLIC PNL TOTAL CA: CPT | Performed by: FAMILY MEDICINE

## 2024-06-07 PROCEDURE — 82043 UR ALBUMIN QUANTITATIVE: CPT | Performed by: FAMILY MEDICINE

## 2024-06-07 PROCEDURE — 80061 LIPID PANEL: CPT | Performed by: FAMILY MEDICINE

## 2024-06-08 LAB
ANION GAP SERPL CALCULATED.3IONS-SCNC: 13 MMOL/L (ref 7–15)
BUN SERPL-MCNC: 30.3 MG/DL (ref 8–23)
CALCIUM SERPL-MCNC: 9.5 MG/DL (ref 8.8–10.2)
CHLORIDE SERPL-SCNC: 102 MMOL/L (ref 98–107)
CHOLEST SERPL-MCNC: 136 MG/DL
CREAT SERPL-MCNC: 1.82 MG/DL (ref 0.67–1.17)
CREAT UR-MCNC: 45.4 MG/DL
DEPRECATED HCO3 PLAS-SCNC: 21 MMOL/L (ref 22–29)
EGFRCR SERPLBLD CKD-EPI 2021: 36 ML/MIN/1.73M2
FASTING STATUS PATIENT QL REPORTED: ABNORMAL
FASTING STATUS PATIENT QL REPORTED: ABNORMAL
GLUCOSE SERPL-MCNC: 488 MG/DL (ref 70–99)
HDLC SERPL-MCNC: 31 MG/DL
LDLC SERPL CALC-MCNC: 26 MG/DL
MICROALBUMIN UR-MCNC: 72.7 MG/L
MICROALBUMIN/CREAT UR: 160.13 MG/G CR (ref 0–17)
NONHDLC SERPL-MCNC: 105 MG/DL
POTASSIUM SERPL-SCNC: 5.7 MMOL/L (ref 3.4–5.3)
SODIUM SERPL-SCNC: 136 MMOL/L (ref 135–145)
TRIGL SERPL-MCNC: 393 MG/DL

## 2024-09-16 ENCOUNTER — LAB REQUISITION (OUTPATIENT)
Dept: LAB | Facility: CLINIC | Age: 84
End: 2024-09-16

## 2024-09-16 DIAGNOSIS — E11.65 TYPE 2 DIABETES MELLITUS WITH HYPERGLYCEMIA (H): ICD-10-CM

## 2024-09-16 LAB
ANION GAP SERPL CALCULATED.3IONS-SCNC: 11 MMOL/L (ref 7–15)
BUN SERPL-MCNC: 27.4 MG/DL (ref 8–23)
CALCIUM SERPL-MCNC: 9.4 MG/DL (ref 8.8–10.4)
CHLORIDE SERPL-SCNC: 106 MMOL/L (ref 98–107)
CREAT SERPL-MCNC: 1.6 MG/DL (ref 0.67–1.17)
EGFRCR SERPLBLD CKD-EPI 2021: 42 ML/MIN/1.73M2
GLUCOSE SERPL-MCNC: 264 MG/DL (ref 70–99)
HCO3 SERPL-SCNC: 24 MMOL/L (ref 22–29)
POTASSIUM SERPL-SCNC: 4.5 MMOL/L (ref 3.4–5.3)
SODIUM SERPL-SCNC: 141 MMOL/L (ref 135–145)

## 2024-09-16 PROCEDURE — 80048 BASIC METABOLIC PNL TOTAL CA: CPT | Performed by: FAMILY MEDICINE

## 2024-09-16 PROCEDURE — 84443 ASSAY THYROID STIM HORMONE: CPT | Performed by: FAMILY MEDICINE

## 2024-09-18 ENCOUNTER — LAB REQUISITION (OUTPATIENT)
Dept: LAB | Facility: CLINIC | Age: 84
End: 2024-09-18

## 2024-09-18 DIAGNOSIS — R53.83 OTHER FATIGUE: ICD-10-CM

## 2024-09-18 LAB — TSH SERPL DL<=0.005 MIU/L-ACNC: 0.86 UIU/ML (ref 0.3–4.2)

## 2024-09-23 ENCOUNTER — TRANSFERRED RECORDS (OUTPATIENT)
Dept: MULTI SPECIALTY CLINIC | Facility: CLINIC | Age: 84
End: 2024-09-23

## 2024-09-23 LAB — RETINOPATHY: NORMAL

## 2025-01-16 ENCOUNTER — HOSPITAL ENCOUNTER (OUTPATIENT)
Facility: HOSPITAL | Age: 85
Setting detail: OBSERVATION
End: 2025-01-16
Payer: COMMERCIAL

## 2025-01-16 ENCOUNTER — APPOINTMENT (OUTPATIENT)
Dept: RADIOLOGY | Facility: HOSPITAL | Age: 85
End: 2025-01-16
Payer: COMMERCIAL

## 2025-01-16 VITALS
OXYGEN SATURATION: 99 % | HEART RATE: 63 BPM | SYSTOLIC BLOOD PRESSURE: 134 MMHG | TEMPERATURE: 98 F | HEIGHT: 65 IN | RESPIRATION RATE: 20 BRPM | DIASTOLIC BLOOD PRESSURE: 73 MMHG | WEIGHT: 130 LBS | BODY MASS INDEX: 21.66 KG/M2

## 2025-01-16 DIAGNOSIS — I44.1 MOBITZ TYPE 2 SECOND DEGREE HEART BLOCK: ICD-10-CM

## 2025-01-16 DIAGNOSIS — R73.9 HYPERGLYCEMIA: ICD-10-CM

## 2025-01-16 LAB
ALBUMIN UR-MCNC: 30 MG/DL
ANION GAP SERPL CALCULATED.3IONS-SCNC: 11 MMOL/L (ref 7–15)
ANION GAP SERPL CALCULATED.3IONS-SCNC: 14 MMOL/L (ref 7–15)
APPEARANCE UR: CLEAR
B-OH-BUTYR SERPL-SCNC: 0.74 MMOL/L
BASE EXCESS BLDV CALC-SCNC: 0 MMOL/L (ref -3–3)
BASOPHILS # BLD AUTO: 0 10E3/UL (ref 0–0.2)
BASOPHILS NFR BLD AUTO: 1 %
BILIRUB UR QL STRIP: NEGATIVE
BUN SERPL-MCNC: 36.3 MG/DL (ref 8–23)
BUN SERPL-MCNC: 41.4 MG/DL (ref 8–23)
CALCIUM SERPL-MCNC: 9.5 MG/DL (ref 8.8–10.4)
CALCIUM SERPL-MCNC: 9.5 MG/DL (ref 8.8–10.4)
CHLORIDE SERPL-SCNC: 102 MMOL/L (ref 98–107)
CHLORIDE SERPL-SCNC: 92 MMOL/L (ref 98–107)
COLOR UR AUTO: COLORLESS
CREAT SERPL-MCNC: 1.62 MG/DL (ref 0.67–1.17)
CREAT SERPL-MCNC: 1.92 MG/DL (ref 0.67–1.17)
EGFRCR SERPLBLD CKD-EPI 2021: 34 ML/MIN/1.73M2
EGFRCR SERPLBLD CKD-EPI 2021: 41 ML/MIN/1.73M2
EOSINOPHIL # BLD AUTO: 0.1 10E3/UL (ref 0–0.7)
EOSINOPHIL NFR BLD AUTO: 2 %
ERYTHROCYTE [DISTWIDTH] IN BLOOD BY AUTOMATED COUNT: 13 % (ref 10–15)
EST. AVERAGE GLUCOSE BLD GHB EST-MCNC: 217 MG/DL
GLUCOSE BLDC GLUCOMTR-MCNC: 387 MG/DL (ref 70–99)
GLUCOSE BLDC GLUCOMTR-MCNC: 437 MG/DL (ref 70–99)
GLUCOSE BLDC GLUCOMTR-MCNC: 459 MG/DL (ref 70–99)
GLUCOSE BLDC GLUCOMTR-MCNC: 510 MG/DL (ref 70–99)
GLUCOSE BLDC GLUCOMTR-MCNC: >600 MG/DL (ref 70–99)
GLUCOSE BLDC GLUCOMTR-MCNC: >600 MG/DL (ref 70–99)
GLUCOSE SERPL-MCNC: 402 MG/DL (ref 70–99)
GLUCOSE SERPL-MCNC: 720 MG/DL (ref 70–99)
GLUCOSE UR STRIP-MCNC: >1000 MG/DL
HBA1C MFR BLD: 9.2 %
HCO3 BLDV-SCNC: 27 MMOL/L (ref 21–28)
HCO3 SERPL-SCNC: 20 MMOL/L (ref 22–29)
HCO3 SERPL-SCNC: 23 MMOL/L (ref 22–29)
HCT VFR BLD AUTO: 39.9 % (ref 40–53)
HGB BLD-MCNC: 13.6 G/DL (ref 13.3–17.7)
HGB UR QL STRIP: ABNORMAL
IMM GRANULOCYTES # BLD: 0 10E3/UL
IMM GRANULOCYTES NFR BLD: 0 %
KETONES UR STRIP-MCNC: ABNORMAL MG/DL
LEUKOCYTE ESTERASE UR QL STRIP: NEGATIVE
LYMPHOCYTES # BLD AUTO: 2 10E3/UL (ref 0.8–5.3)
LYMPHOCYTES NFR BLD AUTO: 27 %
MAGNESIUM SERPL-MCNC: 2.3 MG/DL (ref 1.7–2.3)
MCH RBC QN AUTO: 26.9 PG (ref 26.5–33)
MCHC RBC AUTO-ENTMCNC: 34.1 G/DL (ref 31.5–36.5)
MCV RBC AUTO: 79 FL (ref 78–100)
MONOCYTES # BLD AUTO: 0.4 10E3/UL (ref 0–1.3)
MONOCYTES NFR BLD AUTO: 6 %
NEUTROPHILS # BLD AUTO: 4.6 10E3/UL (ref 1.6–8.3)
NEUTROPHILS NFR BLD AUTO: 64 %
NITRATE UR QL: NEGATIVE
NRBC # BLD AUTO: 0 10E3/UL
NRBC BLD AUTO-RTO: 0 /100
O2/TOTAL GAS SETTING VFR VENT: 21 %
OXYHGB MFR BLDV: 22 % (ref 70–75)
PCO2 BLDV: 53 MM HG (ref 40–50)
PH BLDV: 7.32 [PH] (ref 7.32–7.43)
PH UR STRIP: 6 [PH] (ref 5–7)
PLATELET # BLD AUTO: 157 10E3/UL (ref 150–450)
PO2 BLDV: 17 MM HG (ref 25–47)
POTASSIUM SERPL-SCNC: 4.4 MMOL/L (ref 3.4–5.3)
POTASSIUM SERPL-SCNC: 5.2 MMOL/L (ref 3.4–5.3)
RBC # BLD AUTO: 5.05 10E6/UL (ref 4.4–5.9)
RBC URINE: 1 /HPF
SAO2 % BLDV: 22.7 % (ref 70–75)
SODIUM SERPL-SCNC: 126 MMOL/L (ref 135–145)
SODIUM SERPL-SCNC: 136 MMOL/L (ref 135–145)
SP GR UR STRIP: 1.02 (ref 1–1.03)
TSH SERPL DL<=0.005 MIU/L-ACNC: 0.69 UIU/ML (ref 0.3–4.2)
UROBILINOGEN UR STRIP-MCNC: <2 MG/DL
WBC # BLD AUTO: 7.2 10E3/UL (ref 4–11)
WBC URINE: <1 /HPF

## 2025-01-16 PROCEDURE — 80048 BASIC METABOLIC PNL TOTAL CA: CPT | Performed by: EMERGENCY MEDICINE

## 2025-01-16 PROCEDURE — 36415 COLL VENOUS BLD VENIPUNCTURE: CPT | Performed by: EMERGENCY MEDICINE

## 2025-01-16 PROCEDURE — 81001 URINALYSIS AUTO W/SCOPE: CPT

## 2025-01-16 PROCEDURE — 99285 EMERGENCY DEPT VISIT HI MDM: CPT | Mod: 25

## 2025-01-16 PROCEDURE — 82962 GLUCOSE BLOOD TEST: CPT

## 2025-01-16 PROCEDURE — 82805 BLOOD GASES W/O2 SATURATION: CPT

## 2025-01-16 PROCEDURE — 71045 X-RAY EXAM CHEST 1 VIEW: CPT

## 2025-01-16 PROCEDURE — 82010 KETONE BODYS QUAN: CPT

## 2025-01-16 PROCEDURE — G0378 HOSPITAL OBSERVATION PER HR: HCPCS

## 2025-01-16 PROCEDURE — 96361 HYDRATE IV INFUSION ADD-ON: CPT

## 2025-01-16 PROCEDURE — 84443 ASSAY THYROID STIM HORMONE: CPT | Performed by: EMERGENCY MEDICINE

## 2025-01-16 PROCEDURE — 250N000012 HC RX MED GY IP 250 OP 636 PS 637

## 2025-01-16 PROCEDURE — 96374 THER/PROPH/DIAG INJ IV PUSH: CPT

## 2025-01-16 PROCEDURE — 36415 COLL VENOUS BLD VENIPUNCTURE: CPT

## 2025-01-16 PROCEDURE — 83735 ASSAY OF MAGNESIUM: CPT | Performed by: EMERGENCY MEDICINE

## 2025-01-16 PROCEDURE — 93005 ELECTROCARDIOGRAM TRACING: CPT | Performed by: EMERGENCY MEDICINE

## 2025-01-16 PROCEDURE — 258N000003 HC RX IP 258 OP 636

## 2025-01-16 PROCEDURE — 250N000013 HC RX MED GY IP 250 OP 250 PS 637

## 2025-01-16 PROCEDURE — 82565 ASSAY OF CREATININE: CPT

## 2025-01-16 PROCEDURE — 99223 1ST HOSP IP/OBS HIGH 75: CPT

## 2025-01-16 PROCEDURE — 83036 HEMOGLOBIN GLYCOSYLATED A1C: CPT

## 2025-01-16 PROCEDURE — 85048 AUTOMATED LEUKOCYTE COUNT: CPT

## 2025-01-16 PROCEDURE — 250N000012 HC RX MED GY IP 250 OP 636 PS 637: Performed by: EMERGENCY MEDICINE

## 2025-01-16 PROCEDURE — 85004 AUTOMATED DIFF WBC COUNT: CPT

## 2025-01-16 PROCEDURE — 82374 ASSAY BLOOD CARBON DIOXIDE: CPT | Performed by: EMERGENCY MEDICINE

## 2025-01-16 PROCEDURE — 96375 TX/PRO/DX INJ NEW DRUG ADDON: CPT

## 2025-01-16 PROCEDURE — 80048 BASIC METABOLIC PNL TOTAL CA: CPT

## 2025-01-16 RX ORDER — ACETAMINOPHEN 650 MG/1
650 SUPPOSITORY RECTAL EVERY 4 HOURS PRN
Status: DISCONTINUED | OUTPATIENT
Start: 2025-01-16 | End: 2025-01-17 | Stop reason: HOSPADM

## 2025-01-16 RX ORDER — PROCHLORPERAZINE MALEATE 5 MG/1
5 TABLET ORAL EVERY 6 HOURS PRN
Status: DISCONTINUED | OUTPATIENT
Start: 2025-01-16 | End: 2025-01-17 | Stop reason: HOSPADM

## 2025-01-16 RX ORDER — POLYETHYLENE GLYCOL 3350 17 G/17G
17 POWDER, FOR SOLUTION ORAL DAILY PRN
Status: DISCONTINUED | OUTPATIENT
Start: 2025-01-16 | End: 2025-01-17 | Stop reason: HOSPADM

## 2025-01-16 RX ORDER — GABAPENTIN 100 MG/1
100 CAPSULE ORAL 3 TIMES DAILY
Status: DISCONTINUED | OUTPATIENT
Start: 2025-01-17 | End: 2025-01-17 | Stop reason: HOSPADM

## 2025-01-16 RX ORDER — AMLODIPINE BESYLATE 2.5 MG/1
2.5 TABLET ORAL DAILY
COMMUNITY

## 2025-01-16 RX ORDER — ASPIRIN 81 MG/1
81 TABLET ORAL DAILY
Status: DISCONTINUED | OUTPATIENT
Start: 2025-01-17 | End: 2025-01-17 | Stop reason: HOSPADM

## 2025-01-16 RX ORDER — SODIUM CHLORIDE 9 MG/ML
INJECTION, SOLUTION INTRAVENOUS CONTINUOUS
Status: ACTIVE | OUTPATIENT
Start: 2025-01-16 | End: 2025-01-17

## 2025-01-16 RX ORDER — PRAVASTATIN SODIUM 20 MG
20 TABLET ORAL AT BEDTIME
Status: DISCONTINUED | OUTPATIENT
Start: 2025-01-16 | End: 2025-01-17 | Stop reason: HOSPADM

## 2025-01-16 RX ORDER — AMOXICILLIN 250 MG
1 CAPSULE ORAL 2 TIMES DAILY PRN
Status: DISCONTINUED | OUTPATIENT
Start: 2025-01-16 | End: 2025-01-17 | Stop reason: HOSPADM

## 2025-01-16 RX ORDER — NICOTINE POLACRILEX 4 MG
15-30 LOZENGE BUCCAL
Status: DISCONTINUED | OUTPATIENT
Start: 2025-01-16 | End: 2025-01-17 | Stop reason: HOSPADM

## 2025-01-16 RX ORDER — ONDANSETRON 4 MG/1
4 TABLET, ORALLY DISINTEGRATING ORAL EVERY 6 HOURS PRN
Status: DISCONTINUED | OUTPATIENT
Start: 2025-01-16 | End: 2025-01-17 | Stop reason: HOSPADM

## 2025-01-16 RX ORDER — ACETAMINOPHEN 325 MG/1
650 TABLET ORAL EVERY 4 HOURS PRN
Status: DISCONTINUED | OUTPATIENT
Start: 2025-01-16 | End: 2025-01-17 | Stop reason: HOSPADM

## 2025-01-16 RX ORDER — SENNOSIDES 8.6 MG
650 CAPSULE ORAL EVERY 8 HOURS PRN
Status: DISCONTINUED | OUTPATIENT
Start: 2025-01-16 | End: 2025-01-16 | Stop reason: ALTCHOICE

## 2025-01-16 RX ORDER — DEXTROSE MONOHYDRATE 25 G/50ML
25-50 INJECTION, SOLUTION INTRAVENOUS
Status: DISCONTINUED | OUTPATIENT
Start: 2025-01-16 | End: 2025-01-17 | Stop reason: HOSPADM

## 2025-01-16 RX ORDER — AMOXICILLIN 250 MG
2 CAPSULE ORAL 2 TIMES DAILY PRN
Status: DISCONTINUED | OUTPATIENT
Start: 2025-01-16 | End: 2025-01-17 | Stop reason: HOSPADM

## 2025-01-16 RX ORDER — AMLODIPINE BESYLATE 2.5 MG/1
2.5 TABLET ORAL DAILY
Status: DISCONTINUED | OUTPATIENT
Start: 2025-01-17 | End: 2025-01-17 | Stop reason: HOSPADM

## 2025-01-16 RX ORDER — ONDANSETRON 2 MG/ML
4 INJECTION INTRAMUSCULAR; INTRAVENOUS EVERY 6 HOURS PRN
Status: DISCONTINUED | OUTPATIENT
Start: 2025-01-16 | End: 2025-01-17 | Stop reason: HOSPADM

## 2025-01-16 RX ADMIN — SODIUM CHLORIDE: 9 INJECTION, SOLUTION INTRAVENOUS at 21:10

## 2025-01-16 RX ADMIN — PRAVASTATIN SODIUM 20 MG: 20 TABLET ORAL at 22:40

## 2025-01-16 RX ADMIN — SODIUM CHLORIDE 1000 ML: 9 INJECTION, SOLUTION INTRAVENOUS at 14:02

## 2025-01-16 RX ADMIN — SODIUM CHLORIDE 7 UNITS: 9 INJECTION, SOLUTION INTRAVENOUS at 15:26

## 2025-01-16 RX ADMIN — INSULIN ASPART 10 UNITS: 100 INJECTION, SOLUTION INTRAVENOUS; SUBCUTANEOUS at 21:10

## 2025-01-16 RX ADMIN — INSULIN GLARGINE 30 UNITS: 100 INJECTION, SOLUTION SUBCUTANEOUS at 17:04

## 2025-01-16 ASSESSMENT — ACTIVITIES OF DAILY LIVING (ADL)
ADLS_ACUITY_SCORE: 41

## 2025-01-16 ASSESSMENT — COLUMBIA-SUICIDE SEVERITY RATING SCALE - C-SSRS
2. HAVE YOU ACTUALLY HAD ANY THOUGHTS OF KILLING YOURSELF IN THE PAST MONTH?: NO
6. HAVE YOU EVER DONE ANYTHING, STARTED TO DO ANYTHING, OR PREPARED TO DO ANYTHING TO END YOUR LIFE?: NO
1. IN THE PAST MONTH, HAVE YOU WISHED YOU WERE DEAD OR WISHED YOU COULD GO TO SLEEP AND NOT WAKE UP?: NO

## 2025-01-16 NOTE — ED TRIAGE NOTES
"Patient presents to ED via ambulance for hyperglycemia.  Home health nurse came in today and took blood glucose and discovered it was in the 600s.  Patient states he has felt weak with dizziness and nausea since yesterday.  Blood glucose in triage shows \"high\" on glucometer.   Triage Assessment (Adult)       Row Name 01/16/25 1328          Triage Assessment    Airway WDL WDL        Respiratory WDL    Respiratory WDL WDL        Skin Circulation/Temperature WDL    Skin Circulation/Temperature WDL WDL        Cardiac WDL    Cardiac WDL WDL        Peripheral/Neurovascular WDL    Peripheral Neurovascular WDL WDL        Cognitive/Neuro/Behavioral WDL    Cognitive/Neuro/Behavioral WDL WDL                     "

## 2025-01-16 NOTE — ED NOTES
EMERGENCY DEPARTMENT SIGN OUT NOTE        ED COURSE AND MEDICAL DECISION MAKING  Patient was signed out to me by Dr Martir Mayorga at 3:20 PM.  5:59 PM HR monitor picked up an arrhthymia. Repeat EKG showed a type II heart block. Patient will need to be admitted for cardiology consult.  6:46 PM I spoke with Dr. Servin, hospitalist. Accepted patient for admission for observation tele.    In brief, Dee Sheikh is a 85 year old male who initially presented for elevated glucose.  ? Lantus use last night.  >600 this am.  700 this am in ED. K ok, CO2 ok. Increased frequency of urination. CXR ordered for coughing. IVF and insulin ordered.     At time of sign out, disposition was pending repeat BG. CXR normal.    FINAL IMPRESSION    1. Hyperglycemia    2. Mobitz type 2 second degree heart block        ED MEDS  Medications   sodium chloride 0.9% BOLUS 1,000 mL (0 mLs Intravenous Stopped 1/16/25 1528)   insulin regular 1 unit/mL injection 7 Units (7 Units Intravenous $Given 1/16/25 1526)   insulin glargine (LANTUS PEN) injection 30 Units (30 Units Subcutaneous $Given 1/16/25 1704)       LAB  Labs Ordered and Resulted from Time of ED Arrival to Time of ED Departure   BASIC METABOLIC PANEL - Abnormal       Result Value    Sodium 126 (*)     Potassium 5.2      Chloride 92 (*)     Carbon Dioxide (CO2) 23      Anion Gap 11      Urea Nitrogen 41.4 (*)     Creatinine 1.92 (*)     GFR Estimate 34 (*)     Calcium 9.5      Glucose 720 (*)    ROUTINE UA WITH MICROSCOPIC REFLEX TO CULTURE - Abnormal    Color Urine Colorless      Appearance Urine Clear      Glucose Urine >1000 (*)     Bilirubin Urine Negative      Ketones Urine Trace (*)     Specific Gravity Urine 1.024      Blood Urine 0.03 mg/dL (*)     pH Urine 6.0      Protein Albumin Urine 30 (*)     Urobilinogen Urine <2.0      Nitrite Urine Negative      Leukocyte Esterase Urine Negative      RBC Urine 1      WBC Urine <1     GLUCOSE BY METER - Abnormal    GLUCOSE BY METER POCT >600 (*)     BLOOD GAS VENOUS - Abnormal    pH Venous 7.32      pCO2 Venous 53 (*)     pO2 Venous 17 (*)     Bicarbonate Venous 27      Base Excess/Deficit Venous 0.0      FIO2 21      Oxyhemoglobin Venous 22 (*)     O2 Sat, Venous 22.7 (*)    KETONE BETA-HYDROXYBUTYRATE QUANTITATIVE, RAPID - Abnormal    Ketone (Beta-Hydroxybutyrate) Quantitative 0.74 (*)    CBC WITH PLATELETS AND DIFFERENTIAL - Abnormal    WBC Count 7.2      RBC Count 5.05      Hemoglobin 13.6      Hematocrit 39.9 (*)     MCV 79      MCH 26.9      MCHC 34.1      RDW 13.0      Platelet Count 157      % Neutrophils 64      % Lymphocytes 27      % Monocytes 6      % Eosinophils 2      % Basophils 1      % Immature Granulocytes 0      NRBCs per 100 WBC 0      Absolute Neutrophils 4.6      Absolute Lymphocytes 2.0      Absolute Monocytes 0.4      Absolute Eosinophils 0.1      Absolute Basophils 0.0      Absolute Immature Granulocytes 0.0      Absolute NRBCs 0.0     GLUCOSE BY METER - Abnormal    GLUCOSE BY METER POCT >600 (*)    GLUCOSE BY METER - Abnormal    GLUCOSE BY METER POCT 510 (*)    GLUCOSE BY METER - Abnormal    GLUCOSE BY METER POCT 437 (*)    TSH WITH FREE T4 REFLEX - Normal    TSH 0.69     MAGNESIUM - Normal    Magnesium 2.3     GLUCOSE MONITOR NURSING POCT       EKG  ***    RADIOLOGY    XR Chest 1 View   Final Result   IMPRESSION: Negative chest. Lungs clear.          DISCHARGE MEDS  Current Discharge Medication List           [facility]  [address] [place] [id number] [phone]

## 2025-01-17 VITALS
TEMPERATURE: 97.3 F | HEART RATE: 73 BPM | OXYGEN SATURATION: 99 % | RESPIRATION RATE: 22 BRPM | BODY MASS INDEX: 21.66 KG/M2 | WEIGHT: 130 LBS | SYSTOLIC BLOOD PRESSURE: 132 MMHG | HEIGHT: 65 IN | DIASTOLIC BLOOD PRESSURE: 63 MMHG

## 2025-01-17 LAB
ALBUMIN SERPL BCG-MCNC: 4 G/DL (ref 3.5–5.2)
ALP SERPL-CCNC: 90 U/L (ref 40–150)
ALT SERPL W P-5'-P-CCNC: 15 U/L (ref 0–70)
ANION GAP SERPL CALCULATED.3IONS-SCNC: 12 MMOL/L (ref 7–15)
AST SERPL W P-5'-P-CCNC: 18 U/L (ref 0–45)
BILIRUB DIRECT SERPL-MCNC: 0.2 MG/DL (ref 0–0.3)
BILIRUB SERPL-MCNC: 0.9 MG/DL
BUN SERPL-MCNC: 33.9 MG/DL (ref 8–23)
CALCIUM SERPL-MCNC: 9.7 MG/DL (ref 8.8–10.4)
CHLORIDE SERPL-SCNC: 105 MMOL/L (ref 98–107)
CREAT SERPL-MCNC: 1.64 MG/DL (ref 0.67–1.17)
EGFRCR SERPLBLD CKD-EPI 2021: 41 ML/MIN/1.73M2
ERYTHROCYTE [DISTWIDTH] IN BLOOD BY AUTOMATED COUNT: 13 % (ref 10–15)
GLUCOSE BLDC GLUCOMTR-MCNC: 128 MG/DL (ref 70–99)
GLUCOSE BLDC GLUCOMTR-MCNC: 169 MG/DL (ref 70–99)
GLUCOSE BLDC GLUCOMTR-MCNC: 407 MG/DL (ref 70–99)
GLUCOSE BLDC GLUCOMTR-MCNC: 74 MG/DL (ref 70–99)
GLUCOSE BLDC GLUCOMTR-MCNC: 98 MG/DL (ref 70–99)
GLUCOSE SERPL-MCNC: 66 MG/DL (ref 70–99)
HCO3 SERPL-SCNC: 22 MMOL/L (ref 22–29)
HCT VFR BLD AUTO: 39.1 % (ref 40–53)
HGB BLD-MCNC: 13.7 G/DL (ref 13.3–17.7)
MAGNESIUM SERPL-MCNC: 2.3 MG/DL (ref 1.7–2.3)
MCH RBC QN AUTO: 27.1 PG (ref 26.5–33)
MCHC RBC AUTO-ENTMCNC: 35 G/DL (ref 31.5–36.5)
MCV RBC AUTO: 77 FL (ref 78–100)
PLATELET # BLD AUTO: 169 10E3/UL (ref 150–450)
POTASSIUM SERPL-SCNC: 3.6 MMOL/L (ref 3.4–5.3)
PROT SERPL-MCNC: 7.2 G/DL (ref 6.4–8.3)
RBC # BLD AUTO: 5.06 10E6/UL (ref 4.4–5.9)
SODIUM SERPL-SCNC: 139 MMOL/L (ref 135–145)
TROPONIN T SERPL HS-MCNC: 29 NG/L
TROPONIN T SERPL HS-MCNC: 31 NG/L
WBC # BLD AUTO: 10.1 10E3/UL (ref 4–11)

## 2025-01-17 PROCEDURE — 82248 BILIRUBIN DIRECT: CPT

## 2025-01-17 PROCEDURE — 82962 GLUCOSE BLOOD TEST: CPT

## 2025-01-17 PROCEDURE — 96361 HYDRATE IV INFUSION ADD-ON: CPT

## 2025-01-17 PROCEDURE — 84484 ASSAY OF TROPONIN QUANT: CPT | Performed by: INTERNAL MEDICINE

## 2025-01-17 PROCEDURE — 99239 HOSP IP/OBS DSCHRG MGMT >30: CPT | Performed by: EMERGENCY MEDICINE

## 2025-01-17 PROCEDURE — 250N000013 HC RX MED GY IP 250 OP 250 PS 637

## 2025-01-17 PROCEDURE — 250N000011 HC RX IP 250 OP 636

## 2025-01-17 PROCEDURE — 85041 AUTOMATED RBC COUNT: CPT

## 2025-01-17 PROCEDURE — 85014 HEMATOCRIT: CPT

## 2025-01-17 PROCEDURE — 80053 COMPREHEN METABOLIC PANEL: CPT

## 2025-01-17 PROCEDURE — 99254 IP/OBS CNSLTJ NEW/EST MOD 60: CPT | Performed by: INTERNAL MEDICINE

## 2025-01-17 PROCEDURE — G0378 HOSPITAL OBSERVATION PER HR: HCPCS

## 2025-01-17 PROCEDURE — 83735 ASSAY OF MAGNESIUM: CPT

## 2025-01-17 PROCEDURE — 36415 COLL VENOUS BLD VENIPUNCTURE: CPT | Performed by: INTERNAL MEDICINE

## 2025-01-17 PROCEDURE — 36415 COLL VENOUS BLD VENIPUNCTURE: CPT

## 2025-01-17 RX ADMIN — GABAPENTIN 100 MG: 100 CAPSULE ORAL at 10:05

## 2025-01-17 RX ADMIN — ASPIRIN 81 MG: 81 TABLET, COATED ORAL at 10:05

## 2025-01-17 RX ADMIN — AMLODIPINE BESYLATE 2.5 MG: 2.5 TABLET ORAL at 10:05

## 2025-01-17 RX ADMIN — INSULIN ASPART 11 UNITS: 100 INJECTION, SOLUTION INTRAVENOUS; SUBCUTANEOUS at 00:21

## 2025-01-17 RX ADMIN — GABAPENTIN 100 MG: 100 CAPSULE ORAL at 14:07

## 2025-01-17 RX ADMIN — ONDANSETRON 4 MG: 2 INJECTION INTRAMUSCULAR; INTRAVENOUS at 04:27

## 2025-01-17 ASSESSMENT — ACTIVITIES OF DAILY LIVING (ADL)
ADLS_ACUITY_SCORE: 42
DEPENDENT_IADLS:: CLEANING;COOKING;LAUNDRY;SHOPPING;MEAL PREPARATION;MEDICATION MANAGEMENT;MONEY MANAGEMENT;TRANSPORTATION
ADLS_ACUITY_SCORE: 42
ADLS_ACUITY_SCORE: 42
ADLS_ACUITY_SCORE: 41
ADLS_ACUITY_SCORE: 42
ADLS_ACUITY_SCORE: 42
ADLS_ACUITY_SCORE: 41

## 2025-01-17 NOTE — CONSULTS
Cardiology Consult Note    Thank you, Dr. Cristhian Winter, for asking the [facility] team to see Dee Sheikh in consultation at [facility] ED to evaluate second-degree heart block.      Assessment:   1.  Second-degree heart block, type I.  Patient without clear evidence of type II second-degree AV block.  He does intermittently drop every other beat but in the setting of type I AV block, this is likely the same.  He reports no significant lightheadedness or near syncope but does get dizzy sometimes when his blood sugar gets high.  Did not really have any dizziness prior to this admission.  At this point would continue observation.  No clear indication for permanent pacemaker insertion; however, I did tell them that his conduction system disease may progress to the point that a pacemaker will be needed.  2.  Poorly controlled type 2 diabetes mellitus.  This is what prompted the patient's admission with a blood sugar greater than 600.  3.  Minimal troponin elevation, possibly demand ischemia.  No history of angina.  Patient states that in the nicer weather he will walk 25 minutes outside without any symptoms.  4.  Mixed hyperlipidemia with excellent LDL control at 26.      Clinically Significant Risk Factors Present on Admission         # Hyponatremia: Lowest Na = 126 mmol/L in last 2 days, will monitor as appropriate  # Hypochloremia: Lowest Cl = 92 mmol/L in last 2 days, will monitor as appropriate        # Drug Induced Platelet Defect: home medication list includes an antiplatelet medication            # DMII: A1C = 9.2 % (Ref range: <5.7 %) within past 6 months                 Plan:   1.  Continue telemetry monitoring while here in the hospital.  No clear indication for permanent pacemaker at this time  2.  Consider outpatient monitoring if patient has progressive lightheadedness or near syncope.    Primary cardiologist: None     Current History:   Mr. Dee Sheikh is a 85 year old male  with poorly controlled type 2 diabetes mellitus, hypertension, mixed hyperlipidemia who was brought to the ED because of severely elevated blood sugar of greater than 600.  At the time of his presentation, he reported no symptoms of chest discomfort, shortness of breath or dizziness.  Was admitted for treatment and was noted to have prolonged first-degree AV block as well as evidence of second-degree AV block type I although there are periods where his heart rate drops more significantly raising the question of type II second-degree AV block.  For this reason cardiac consult requested.  Patient reports occasional episodes of dizziness when his blood sugars get very high although did not really have dizziness when he presented yesterday.  Denies any near syncope or true syncope.  No exertional chest tightness or shortness of breath.  States he walks 25 minutes through the neighborhood but has not been doing it since the cold weather.    Past Medical History:   -Hypertension  -Type 2 diabetes mellitus on insulin  -Mixed hyperlipidemia    Past Surgical History:     Past Surgical History:   Procedure Laterality Date    NO PAST SURGERIES         Family History:   No family history of premature CAD    Social History:    reports that he has never smoked. He has never used smokeless tobacco. He reports that he does not drink alcohol.    Meds:     Current Facility-Administered Medications:     acetaminophen (TYLENOL) tablet 650 mg, 650 mg, Oral, Q4H PRN **OR** acetaminophen (TYLENOL) Suppository 650 mg, 650 mg, Rectal, Q4H PRN, Santo Servin MD    amLODIPine (NORVASC) tablet 2.5 mg, 2.5 mg, Oral, Daily, Santo Servin MD    aspirin EC tablet 81 mg, 81 mg, Oral, Daily, Santo Servin MD    glucose gel 15-30 g, 15-30 g, Oral, Q15 Min PRN **OR** dextrose 50 % injection 25-50 mL, 25-50 mL, Intravenous, Q15 Min PRN **OR** glucagon injection 1 mg, 1 mg, Subcutaneous, Q15 Min PRN, Santo Servin MD     gabapentin (NEURONTIN) capsule 100 mg, 100 mg, Oral, TID, Santo Servin MD    insulin aspart (NovoLOG) injection (RAPID ACTING), 1-12 Units, Subcutaneous, Q4H, Santo Servin MD, 11 Units at 01/17/25 0021    insulin glargine (LANTUS PEN) injection 30 Units, 30 Units, Subcutaneous, At Bedtime, Santo Servin MD    ondansetron (ZOFRAN ODT) ODT tab 4 mg, 4 mg, Oral, Q6H PRN **OR** ondansetron (ZOFRAN) injection 4 mg, 4 mg, Intravenous, Q6H PRN, Santo Servin MD, 4 mg at 01/17/25 0427    polyethylene glycol (MIRALAX) powder 17 g, 17 g, Oral, Daily PRN, Santo Servin MD    pravastatin (PRAVACHOL) tablet 20 mg, 20 mg, Oral, At Bedtime, Santo Servin MD, 20 mg at 01/16/25 2240    prochlorperazine (COMPAZINE) injection 5 mg, 5 mg, Intravenous, Q6H PRN **OR** prochlorperazine (COMPAZINE) tablet 5 mg, 5 mg, Oral, Q6H PRN, Santo Servin MD    senna-docusate (SENOKOT-S/PERICOLACE) 8.6-50 MG per tablet 1 tablet, 1 tablet, Oral, BID PRN **OR** senna-docusate (SENOKOT-S/PERICOLACE) 8.6-50 MG per tablet 2 tablet, 2 tablet, Oral, BID PRN, Santo Servin MD    Current Outpatient Medications:     acetaminophen (TYLENOL) 650 MG CR tablet, Take 650 mg by mouth every 8 hours as needed for pain, Disp: , Rfl:     amLODIPine (NORVASC) 2.5 MG tablet, Take 2.5 mg by mouth daily., Disp: , Rfl:     aspirin 81 MG EC tablet, Take 81 mg by mouth daily , Disp: , Rfl:     fluticasone (FLONASE) 50 MCG/ACT nasal spray, Spray 1 spray into both nostrils daily, Disp: , Rfl:     gabapentin (NEURONTIN) 100 MG capsule, Take 100 mg by mouth 3 times daily, Disp: , Rfl:     insulin glargine (LANTUS PEN) 100 UNIT/ML pen, Inject 34 Units subcutaneously every morning., Disp: , Rfl:     metFORMIN (GLUCOPHAGE-XR) 750 MG 24 hr tablet, Take 750 mg by mouth 2 times daily (with meals) , Disp: , Rfl:     Multiple Vitamins-Minerals (MULTIVITAMIN ADULTS PO), Take 1 tablet by mouth daily, Disp: , Rfl:      "polyethylene glycol (MIRALAX) 17 GM/Dose powder, Take 1 capful by mouth daily as needed for constipation , Disp: , Rfl:     pravastatin (PRAVACHOL) 20 MG tablet, Take 20 mg by mouth daily , Disp: , Rfl:     sitagliptin (JANUVIA) 50 MG tablet, Take 50 mg by mouth daily., Disp: , Rfl:     blood glucose monitoring (NO BRAND SPECIFIED) meter device kit, Use to test blood sugar once daily, Disp: , Rfl:   Current Facility-Administered Medications   Medication Dose Route Frequency Provider Last Rate Last Admin    amLODIPine (NORVASC) tablet 2.5 mg  2.5 mg Oral Daily Santo Servin MD        aspirin EC tablet 81 mg  81 mg Oral Daily Santo Servin MD        gabapentin (NEURONTIN) capsule 100 mg  100 mg Oral TID Santo Servin MD        insulin aspart (NovoLOG) injection (RAPID ACTING)  1-12 Units Subcutaneous Q4H Santo Servin MD   11 Units at 01/17/25 0021    insulin glargine (LANTUS PEN) injection 30 Units  30 Units Subcutaneous At Bedtime Santo Servin MD        pravastatin (PRAVACHOL) tablet 20 mg  20 mg Oral At Bedtime Santo Servin MD   20 mg at 01/16/25 2240       Allergies:   Patient has no known allergies.    Review of Systems:   A 12 point comprehensive review of systems was negative except as noted in the current history.    Objective:      Physical Exam  Body mass index is 21.63 kg/m .  /58 (BP Location: Left arm)   Pulse 68   Temp 97.7  F (36.5  C) (Oral)   Resp 17   Ht 1.651 m (5' 5\")   Wt 59 kg (130 lb)   SpO2 99%   BMI 21.63 kg/m      General Appearance:   Well-developed, well-nourished elderly male who is in no acute distress lying supine in bed   HEENT:  Normocephalic, atraumatic.  Sclera nonicteric.  Mucous membranes appear slightly dry.   Neck: No jugular venous distention or carotid bruits   Chest: Symmetric with normal AP diameter   Lungs:   Clear to auscultation bilaterally   Cardiovascular:   Regular rate and rhythm.  S1, S2 normal.  No murmur " "or gallop   Abdomen:  Soft, nondistended, nontender.  No organomegaly or mass   Extremities: No peripheral edema, clubbing or cyanosis   Skin: No rash or lesions   Neurologic: Alert and oriented x 3.  No gross focal deficit.             Cardiographics (personally reviewed)  Initial ECG demonstrates sinus rhythm with prolonged first-degree AV block and occasional PVCs.  No acute ST or T wave changes.  Repeat ECG suggests possible second-degree AV block.  Review of multiple telemetry strips demonstrates type I second-degree AV block.  No clear evidence of type II second-degree AV block or complete heart block.    Imaging (personally reviewed)  No cardiac imaging    Lab Review (personally reviewed all results)  Recent Labs   Lab Test 06/07/24  1026   CHOL 136   HDL 31*   LDL 26   TRIG 393*     Recent Labs   Lab Test 06/07/24  1026 02/17/23  0903 03/23/22  0928   LDL 26 55 48     Recent Labs   Lab Test 01/17/25  0852 01/17/25  0413 01/17/25  0343   NA  --   --  139   POTASSIUM  --   --  3.6   CHLORIDE  --   --  105   CO2  --   --  22   GLC 98   < > 66*   BUN  --   --  33.9*   CR  --   --  1.64*   GFRESTIMATED  --   --  41*   CHIQUITA  --   --  9.7    < > = values in this interval not displayed.     Recent Labs   Lab Test 01/17/25  0343 01/16/25 2022 01/16/25  1402   CR 1.64* 1.62* 1.92*     Recent Labs   Lab Test 01/16/25  1402   A1C 9.2*          Recent Labs   Lab Test 01/17/25  0343   WBC 10.1   HGB 13.7   HCT 39.1*   MCV 77*        Recent Labs   Lab Test 01/17/25  0343 01/16/25  1402 02/21/24  0947   HGB 13.7 13.6 13.7    No results for input(s): \"TROPONINI\" in the last 17139 hours.  No results for input(s): \"BNP\", \"NTBNPI\", \"NTBNP\" in the last 68645 hours.  Recent Labs   Lab Test 01/16/25  1402   TSH 0.69     No results for input(s): \"INR\" in the last 49328 hours.              "

## 2025-01-17 NOTE — PLAN OF CARE
Goal Outcome Evaluation:      Plan of Care Reviewed With: patient, child          Outcome Evaluation: Likely home at discharge. Will continue with her PCA services.

## 2025-01-17 NOTE — PLAN OF CARE
Assumed care for pt from 2000-2330.  Pt is Hmong speaking, son at bedside and helped interpret. A&O x4, VSS. Denies chest pain and other pain. BG was 387 and 459. Pt ate late dinner around 2100. Extra 10 units of lantus given per orders. Used urinal at bedside, stable on his feet.     Problem: Adult Inpatient Plan of Care  Goal: Absence of Hospital-Acquired Illness or Injury  Outcome: Progressing  Intervention: Identify and Manage Fall Risk  Recent Flowsheet Documentation  Taken 1/16/2025 2030 by Joseph Martino, RN  Safety Promotion/Fall Prevention:   activity supervised   assistive device/personal items within reach   nonskid shoes/slippers when out of bed   lighting adjusted   clutter free environment maintained   room door open   safety round/check completed  Intervention: Prevent Infection  Recent Flowsheet Documentation  Taken 1/16/2025 2030 by Joseph Martino, RN  Infection Prevention:   hand hygiene promoted   rest/sleep promoted   single patient room provided  Goal: Optimal Comfort and Wellbeing  Outcome: Progressing     Problem: Glycemic Control Impaired  Goal: Blood Glucose Level Within Targeted Range  Outcome: Progressing     Problem: Dysrhythmia  Goal: Normalized Cardiac Rhythm  1/16/2025 2314 by Joseph Martino, RN  Outcome: Progressing  1/16/2025 2313 by Joseph Martino RN  Outcome: Progressing   Goal Outcome Evaluation:

## 2025-01-17 NOTE — CONSULTS
"Care Management Initial Consult    General Information  Assessment completed with: Patient, Children, Bee and son Leydi Sheikh  Type of CM/SW Visit: Initial Assessment    Primary Care Provider verified and updated as needed: Yes (updated facesheet to her PMD Cathy Caballero at [facility]   Readmission within the last 30 days: no previous admission in last 30 days      Reason for Consult: discharge planning  Advance Care Planning: Advance Care Planning Reviewed: no concerns identified (\"doesn't have one\")          Communication Assessment  Patient's communication style: spoken language (non-English) (speaks Hmong)                        Living Environment:   People in home: child(whitney), adult, grandchild(whitney)  Dee and son Mariusz and daughter in law and grandkids.  Current living Arrangements: house      Able to return to prior arrangements: yes       Family/Social Support:  Care provided by: self, other (see comments) (daughter in law that lives with her is her PCA.)  Provides care for: no one, unable/limited ability to care for self  Marital Status:   Support system: Children, PCA          Description of Support System: Supportive, Involved    Support Assessment: Adequate family and caregiver support, Adequate social supports, Patient communicates needs well met    Current Resources:   Patient receiving home care services: No        Community Resources: PCA (\"daughter in law that lives with her is her PCA\")  Equipment currently used at home: cane, straight, glucometer  Supplies currently used at home: Diabetic Supplies    Employment/Financial:  Employment Status: disabled     Employment/ Comments: \"no  history\"  Financial Concerns: none   Referral to Financial Worker: No       Does the patient's insurance plan have a 3 day qualifying hospital stay waiver?  No    Lifestyle & Psychosocial Needs:  Social Drivers of Health     Food Insecurity: Not on file   Depression: Not on file "   Housing Stability: Not on file   Tobacco Use: Low Risk  (2/21/2024)    Patient History     Smoking Tobacco Use: Never     Smokeless Tobacco Use: Never     Passive Exposure: Not on file   Financial Resource Strain: Not on file   Alcohol Use: Not on file   Transportation Needs: Not on file   Physical Activity: Not on file   Interpersonal Safety: Not on file   Stress: Not on file   Social Connections: Not on file   Health Literacy: Not on file       Functional Status:  Prior to admission patient needed assistance:   Dependent ADLs:: Ambulation-cane, Bathing, Dressing  Dependent IADLs:: Cleaning, Cooking, Laundry, Shopping, Meal Preparation, Medication Management, Money Management, Transportation  Assesssment of Functional Status: At functional baseline    Mental Health Status:  Mental Health Status: No Current Concerns       Chemical Dependency Status:  Chemical Dependency Status: No Current Concerns             Values/Beliefs:  Spiritual, Cultural Beliefs, Mosque Practices, Values that affect care: no               Discussed  Partnership in Safe Discharge Planning  document with patient/family: No    Additional Information:  Assessment: Signed off    Dee lives in a house with her son Mariusz and daughter in law and grandkids. Her daughter in law who lives with her is her PCA. She uses a cane for mobility.    Family to transport at discharge.    FATIMA was given and discussed. All questions were answered.    Writer verified patient demographics and updated any changes needed in the patient chart.    Next Steps:   Plan: Likely home at discharge. Will continue with her PCA services.  Needs: Likely no new needs.    No further care management intervention anticipated at this time.  Please re-consult if further needs arise.  Care management signing off.    Judy Santiago RN

## 2025-01-17 NOTE — PROGRESS NOTES
"PRIMARY DIAGNOSIS: \"GENERIC\" NURSING  OUTPATIENT/OBSERVATION GOALS TO BE MET BEFORE DISCHARGE:  ADLs back to baseline: No    Activity and level of assistance: Up with standby assistance.    Pain status: Pain free.    Return to near baseline physical activity: Yes     Discharge Planner Nurse   Safe discharge environment identified: Yes  Barriers to discharge: Yes       Entered by: Natasha Castillo RN 01/17/2025 5:19 PM     Please review provider order for any additional goals.   Nurse to notify provider when observation goals have been met and patient is ready for discharge.    Pt a/o ,  line used for communication, son at bedside--pt speaks few words of english and son assists a little bit. Pt denies pain. States is feeling better. Pt npo. Iv saline locked. Ice pack to right a/c site, pt reports is tender, slightly swollen. Cardiology at bedside seeing pt. Reminded pt to use call light for needs. Continue to monitor pt.   "

## 2025-01-17 NOTE — PHARMACY-ADMISSION MEDICATION HISTORY
Pharmacist Admission Medication History    Admission medication history is complete. The information provided in this note is only as accurate as the sources available at the time of the update.    Information Source(s): Family member, Clinic records, and Prescription bottles via in-person    Pertinent Information: Patient's Three sons were able to confirm last administration times and was able to bring in current prescription vials.    Changes made to PTA medication list:  Added: Amlodipine + Januvia  Deleted: Glipizide  Changed: None    Allergies reviewed with patient and updates made in EHR: yes    Medication History Completed By: Jf Schuster [facility] 1/16/2025 10:15 PM    PTA Med List   Medication Sig Last Dose/Taking    acetaminophen (TYLENOL) 650 MG CR tablet Take 650 mg by mouth every 8 hours as needed for pain Taking As Needed    amLODIPine (NORVASC) 2.5 MG tablet Take 2.5 mg by mouth daily. 1/16/2025 Morning    aspirin 81 MG EC tablet Take 81 mg by mouth daily  1/16/2025 Morning    fluticasone (FLONASE) 50 MCG/ACT nasal spray Spray 1 spray into both nostrils daily 1/16/2025 Morning    gabapentin (NEURONTIN) 100 MG capsule Take 100 mg by mouth 3 times daily 1/16/2025 Morning    insulin glargine (LANTUS PEN) 100 UNIT/ML pen Inject 34 Units subcutaneously every morning. 1/15/2025 Morning    metFORMIN (GLUCOPHAGE-XR) 750 MG 24 hr tablet Take 750 mg by mouth 2 times daily (with meals)  1/16/2025 Morning    Multiple Vitamins-Minerals (MULTIVITAMIN ADULTS PO) Take 1 tablet by mouth daily 1/16/2025 Morning    polyethylene glycol (MIRALAX) 17 GM/Dose powder Take 1 capful by mouth daily as needed for constipation  Taking As Needed    pravastatin (PRAVACHOL) 20 MG tablet Take 20 mg by mouth daily  1/15/2025 Bedtime    sitagliptin (JANUVIA) 50 MG tablet Take 50 mg by mouth daily. 1/16/2025 Morning

## 2025-01-17 NOTE — PROGRESS NOTES
Discharge instructions reviewed w/ pt and son, via  ipad. Pt and son verbalized understanding. Pt discharged to home at this time w/ belongings. Pt walked out to son's car w/ cane, gait steady. Declined w/c Pt states is glad to be going home.

## 2025-01-17 NOTE — DISCHARGE SUMMARY
[facility] MEDICINE  DISCHARGE SUMMARY     Primary Care Physician: Cathy Caballero  Admission Date: 1/16/2025   Discharge Provider: Cristhian Winter MD Discharge Date: 1/17/2025   Diet:   Active Diet and Nourishment Order   Procedures    Combination Diet Moderate Consistent Carb (60 g CHO per Meal) Diet    Diet       Code Status: Full Code   Activity: DCACTIVITY: Activity as tolerated        Condition at Discharge: Good     REASON FOR PRESENTATION(See Admission Note for Details)   Hypoglycemia, concern for heart block    PRINCIPAL & ACTIVE DISCHARGE DIAGNOSES     Active Problems:    Mobitz type 2 second degree heart block    Hyperglycemia      PENDING LABS     Unresulted Labs Ordered in the Past 30 Days of this Admission       No orders found for last 31 day(s).              PROCEDURES ( this hospitalization only)          RECOMMENDATIONS TO OUTPATIENT PROVIDER FOR F/U VISIT     Follow-up Appointments       Hospital Follow-up with Existing Primary Care Provider (PCP)      Please see details below         Schedule Primary Care visit within: 3-5 Days (Urgent)   Recommended labs and Imaging (to be ordered by Primary Care Provider): Recheck blood sugar control and adjust insulin if needed               {Additional follow-up instructions/to-do's for PCP    : Recheck blood sugar control with recent insulin changes    DISPOSITION     Home    SUMMARY OF HOSPITAL COURSE:      Dee is a 85-year-old male with diabetes admitted with hyperglycemia and concerns for heart block.  He was seen by cardiology and found to have second-degree heart block type I without clear evidence of type II second-degree AV block.  At this point no pacemaker recommended.  He has not been bradycardic.  Interview done with the assistance of family member indicates that he feels back to his normal baseline self.  His other issue is hypoglycemia with blood sugars in the 700s however he had not taken his Lantus  prior to coming in.  Blood sugars ran a little bit low while receiving a total of 73 units here.  His A1c was 9.2.  Will add an extra dose of the Lantus in the evening to start with but this will likely need to be adjusted.  He will need to follow-up within 5 to 7 days in his clinic for reevaluation.  It was also asked that he monitor his blood sugars at home.  He is medically stable for discharge but I would have like to watch him 1 more night to make sure these insulin changes are adequate but he  desired discharge home and will be watched closely..    Discharge Medications with Med changes:     Current Discharge Medication List        CONTINUE these medications which have CHANGED    Details   !! insulin glargine (LANTUS PEN) 100 UNIT/ML pen Inject 10 Units subcutaneously at bedtime.    Comments: If Lantus is not covered by insurance, may substitute Basaglar or Semglee or other insulin glargine product per insurance preference at same dose and frequency.    Associated Diagnoses: Hyperglycemia       !! - Potential duplicate medications found. Please discuss with provider.        CONTINUE these medications which have NOT CHANGED    Details   acetaminophen (TYLENOL) 650 MG CR tablet Take 650 mg by mouth every 8 hours as needed for pain      amLODIPine (NORVASC) 2.5 MG tablet Take 2.5 mg by mouth daily.      aspirin 81 MG EC tablet Take 81 mg by mouth daily       fluticasone (FLONASE) 50 MCG/ACT nasal spray Spray 1 spray into both nostrils daily      gabapentin (NEURONTIN) 100 MG capsule Take 100 mg by mouth 3 times daily      !! insulin glargine (LANTUS PEN) 100 UNIT/ML pen Inject 34 Units subcutaneously every morning.      metFORMIN (GLUCOPHAGE-XR) 750 MG 24 hr tablet Take 750 mg by mouth 2 times daily (with meals)       Multiple Vitamins-Minerals (MULTIVITAMIN ADULTS PO) Take 1 tablet by mouth daily      polyethylene glycol (MIRALAX) 17 GM/Dose powder Take 1 capful by mouth daily as needed for constipation      "  pravastatin (PRAVACHOL) 20 MG tablet Take 20 mg by mouth daily       sitagliptin (JANUVIA) 50 MG tablet Take 50 mg by mouth daily.      blood glucose monitoring (NO BRAND SPECIFIED) meter device kit Use to test blood sugar once daily       !! - Potential duplicate medications found. Please discuss with provider.                Rationale for medication changes:              Consults       CARDIOLOGY IP CONSULT  CARE MANAGEMENT / SOCIAL WORK IP CONSULT    Immunizations given this encounter     Most Recent Immunizations   Administered Date(s) Administered    COVID-19 12+ (Pfizer) 12/20/2024    COVID-19 MONOVALENT 12+ (Pfizer) 05/23/2022           Anticoagulation Information      Recent INR results: No results for input(s): \"INR\" in the last 168 hours.  Warfarin doses (if applicable) or name of other anticoagulant:       SIGNIFICANT IMAGING FINDINGS     Results for orders placed or performed during the hospital encounter of 01/16/25   XR Chest 1 View    Impression    IMPRESSION: Negative chest. Lungs clear.       SIGNIFICANT LABORATORY FINDINGS     Most Recent 3 BMP's:  Recent Labs   Lab Test 01/17/25  1231 01/17/25  0852 01/17/25  0502 01/17/25  0413 01/17/25  0343 01/16/25  2223 01/16/25  2022 01/16/25  1519 01/16/25  1402   NA  --   --   --   --  139  --  136  --  126*   POTASSIUM  --   --   --   --  3.6  --  4.4  --  5.2   CHLORIDE  --   --   --   --  105  --  102  --  92*   CO2  --   --   --   --  22  --  20*  --  23   BUN  --   --   --   --  33.9*  --  36.3*  --  41.4*   CR  --   --   --   --  1.64*  --  1.62*  --  1.92*   ANIONGAP  --   --   --   --  12  --  14  --  11   CHIQUITA  --   --   --   --  9.7  --  9.5  --  9.5   * 98 128*   < > 66*   < > 402*   < > 720*    < > = values in this interval not displayed.           Discharge Orders        Reason for your hospital stay    Hyperglycemia     Activity    Your activity upon discharge: activity as tolerated     Diet    Follow this diet upon discharge: Current " Diet:Orders Placed This Encounter      Combination Diet Moderate Consistent Carb (60 g CHO per Meal) Diet     Hospital Follow-up with Existing Primary Care Provider (PCP)    Please see details below            Examination   Physical Exam   Temp:  [97.3  F (36.3  C)-98.4  F (36.9  C)] 97.3  F (36.3  C)  Pulse:  [63-75] 68  Resp:  [13-35] 17  BP: (129-192)/(58-91) 132/63  SpO2:  [96 %-100 %] 99 %  Wt Readings from Last 1 Encounters:   01/16/25 59 kg (130 lb)       General: No apparent distress  Heart: Rate rhythm  Lungs: Clear to auscultation  Extremities: No edema      Please see EMR for more detailed significant labs, imaging, consultant notes etc.    I, Cristhian Winter MD, personally saw the patient today and spent greater than 30 minutes discharging this patient.    Cristhian Winter MD  [facility]    CC:Cathy Caballero

## 2025-01-17 NOTE — H&P
[facility]    History and Physical - Hospitalist Service       Date of Admission:  1/16/2025    Assessment & Plan      Dee Sheikh is a 85 year old male admitted on 1/16/2025. PMH of type 2 dm, first-degree AV block, admitted for management of second-degree type II AV block, and hyperglycemia without DKA or HHS.     Second-degree type 2 AV block  Assessment: EKG with typical second-degree type II AV block, no syncope.  Patient not on beta-blocker.  Plan  - Telemetry  - Cardiology consulted  - Avoid AV wilber blocking agents  - N.p.o. midnight for potential pacemaker in the morning    Type II DM  Hyperglycemia  Assessment: Patient presented to the ED with blood sugar initially of 720,-status post fluid resuscitation, current blood sugar at 437, A1c: 9.2%, questionable adherence, most recent BS: 217.  - Continue PTA Lantus at 30 units/day  - High intensity sliding scale  - Continue to closely monitor  - Encourage p.o. intake  - NS at 100 cc an hour  - Holding PTA oral hypoglycemic agents       # Pseudo hyponatremia: Corrected for upper glycemia: 134  - BMP in the morning.   - Will monitor as appropriate  # Hypochloremia: Lowest Cl = 92 mmol/L in last 2 days, will monitor as appropriate        # Drug Induced Platelet Defect: home medication list includes an antiplatelet medication                Diet:  Carb consistent diet, n.p.o. midnight for potential cardiac intervention the morning  DVT Prophylaxis: Pneumatic compression device  Gillespie Catheter: Not present  Lines: None     Cardiac Monitoring: None  Code Status:  Full code    Clinically Significant Risk Factors Present on Admission          Disposition Plan     Medically Ready for Discharge: Anticipated Tomorrow           Santo Servin MD  Hospitalist Service  [facility]  Securely message with [employer] (more info)  Text page via [facility] Paging/Directory  "    ______________________________________________________________________    Chief Complaint       History is obtained from the patient, chart review, and ED provider.     History of Present Illness   Dee Sheikh is a 85 year old male admitted on 1/16/2025. PMH of type 2 dm, first-degree AV block, admitted for management of second-degree type II AV block, and hyperglycemia without DKA or HHS.     Patient presents to ED via ambulance for hyperglycemia.  Home health nurse came in today and took blood glucose and discovered it was in the 600s.  Patient states he has felt weak with dizziness and nausea since yesterday.  Blood glucose in triage shows \"high\" on glucometer.   EKG consistent with secondary type II AV block, but patient not symptomatic.  No fevers or chills, unintentional weight loss, lymphadenopathy, headache, paraesthesias, or weakness in a limb, shortness of breath or wheezing, chest pain or pressure, arthralgias or myalgias, rashes or skin changes, odynophagia or dysphagia, nausea or vomiting, early satiety, abdominal pain, abdominal distension/bloating, diarrhea, constipation, BRBPR, hematochezia, melena, jaundice or scleral icterus          Past Medical History    History reviewed. No pertinent past medical history.    Past Surgical History   Past Surgical History:   Procedure Laterality Date    NO PAST SURGERIES         Prior to Admission Medications   Prior to Admission Medications   Prescriptions Last Dose Informant Patient Reported? Taking?   Multiple Vitamins-Minerals (MULTIVITAMIN ADULTS PO)   Yes No   Sig: Take 1 tablet by mouth daily   Nutritional Supplements (ENSURE ACTIVE LIGHT) LIQD   Yes No   Sig: Take 1 Bottle by mouth daily   acetaminophen (TYLENOL) 650 MG CR tablet   Yes No   Sig: Take 650 mg by mouth every 8 hours as needed for pain   aspirin 81 MG EC tablet   Yes No   Sig: Take 81 mg by mouth daily    blood glucose monitoring (NO BRAND SPECIFIED) meter device kit   Yes No   Sig: Use to " test blood sugar once daily   fluticasone (FLONASE) 50 MCG/ACT nasal spray   Yes No   Sig: Spray 1 spray into both nostrils daily   gabapentin (NEURONTIN) 100 MG capsule   Yes No   Sig: Take 100 mg by mouth 3 times daily   glipiZIDE (GLUCOTROL XL) 10 MG 24 hr tablet   Yes No   Sig: Take 1 tablet (10 mg) by mouth 2 times daily (before meals)   insulin glargine (LANTUS PEN) 100 UNIT/ML pen   Yes No   Sig: Inject 30 Units Subcutaneous At Bedtime   insulin glargine (LANTUS PEN) 100 UNIT/ML pen   No Yes   Sig: Inject 30 Units subcutaneously at bedtime.   metFORMIN (GLUCOPHAGE-XR) 750 MG 24 hr tablet   Yes No   Sig: Take 750 mg by mouth 2 times daily (with meals)    polyethylene glycol (MIRALAX) 17 GM/Dose powder   Yes No   Sig: Take 1 capful by mouth daily as needed for constipation    pravastatin (PRAVACHOL) 20 MG tablet   Yes No   Sig: Take 20 mg by mouth daily       Facility-Administered Medications: None        Review of Systems    The 10 point Review of Systems is negative other than noted in the HPI or here.      Physical Exam   Vital Signs: Temp: 98  F (36.7  C) Temp src: Oral BP: (!) 159/66 Pulse: 73   Resp: 20 SpO2: 96 % O2 Device: None (Room air)    Weight: 130 lbs 0 oz    General Appearance: Not in distress  Respiratory: Clear to auscultation bilaterally, no added breath sounds  Cardiovascular: S1 and S2 well heard, no murmur or gallop  GI: Nontender, nondistended, positive bowel sounds  Skin: No rash  CNS: Alert and oriented x 3, nonfocal      Medical Decision Making       76 MINUTES SPENT BY ME on the date of service doing chart review, history, exam, documentation & further activities per the note.      Data     I have personally reviewed the following data over the past 24 hrs:    7.2  \   13.6   / 157     126 (L) 92 (L) 41.4 (H) /  387 (H)   5.2 23 1.92 (H) \     TSH: 0.69 T4: N/A A1C: 9.2 (H)       Imaging results reviewed over the past 24 hrs:   Recent Results (from the past 24 hours)   XR Chest 1 View     Narrative    EXAM: XR CHEST 1 VIEW  LOCATION: [facility]  DATE: 1/16/2025    INDICATION: COUGH EVAL FOR PNEUMONIA  COMPARISON: None.      Impression    IMPRESSION: Negative chest. Lungs clear.

## 2025-01-17 NOTE — PLAN OF CARE
Problem: Adult Inpatient Plan of Care  Goal: Optimal Comfort and Wellbeing  Outcome: Progressing     Problem: Glycemic Control Impaired  Goal: Minimize Risk of Hypoglycemia  Outcome: Progressing     Problem: Dysrhythmia  Goal: Normalized Cardiac Rhythm  Outcome: Progressing   Goal Outcome Evaluation:       Pt A&Ox4, able to make needs known appropriately. Family at bedside to assist with communication needs. BG at midnight 407. 11 units novolog given per sliding scale. Notified house officer of BG >350. Dr. Graves ordered additional 1x 5 units lantus. BG at 0400 was 74. Small amount of juice given as pt did not meet parameters for D50 administration. Pt also c/o nausea at that time. Rechecked BG after juice was given, BG increased to 128. Pt denied nausea or any other s/s of hypoglycemia.   Pt has been in between SR with first degree AV block and second degree block type 2 overnight. When in second degree block type 2, pt can become jered in the high 30s, but not sustained. Mostly HR in 60-70s. Notified provider of periodic episodes of bradycardia. Provider ordered troponin levels ordered; 29 and 31. Repeat troponin scheduled for am lab draw. Awaiting cardiology consult.   Otherwise vitally stable. Denied pain or any cardiac symptoms overnight. Slept intermittently between cares.

## 2025-01-17 NOTE — PROGRESS NOTES
"PRIMARY DIAGNOSIS: \"GENERIC\" NURSING  OUTPATIENT/OBSERVATION GOALS TO BE MET BEFORE DISCHARGE:  ADLs back to baseline: Yes    Activity and level of assistance: Up with standby assistance.    Pain status: Pain free.    Return to near baseline physical activity: Yes     Discharge Planner Nurse   Safe discharge environment identified: Yes  Barriers to discharge: No       Entered by: Natasha Castillo RN 01/17/2025 5:22 PM     Please review provider order for any additional goals.   Nurse to notify provider when observation goals have been met and patient is ready for discharge.      Pt a/o ,  line used for communication, son at bedside--pt speaks few words of english and son assists a little bit. Pt denies pain. States is feeling better. Pt ate some food from home and some of food from cafe for lunch. Voiding in urinal at bedside.  Reminded pt to use call light for needs. Continue to monitor pt. Pt aware of plan to discharge shortly, writer preparing paperwork.  "

## 2025-01-19 NOTE — ED PROVIDER NOTES
"EMERGENCY DEPARTMENT ENCOUNTER      NAME: Dee Sheikh  AGE: 85 year old male  YOB: 1940  MRN: [id number]  EVALUATION DATE & TIME: 1/16/2025  1:40 PM    PCP: Cathy Caballero    ED PROVIDER: Martir Mayorga MD      Chief Complaint   Patient presents with    Hyperglycemia         FINAL IMPRESSION:  1. Hyperglycemia    2. Mobitz type 2 second degree heart block          ED COURSE & MEDICAL DECISION MAKING:    Pertinent Labs & Imaging studies reviewed. (See chart for details)  85 year old male presents to the Emergency Department for evaluation of hyperglycemia.  Differential diagnosis considered DKA, electrolyte disturbances, dehydration, UTI, pneumonia, HHS.     Triage Note: Patient presents to ED via ambulance for hyperglycemia.  Home health nurse came in today and took blood glucose and discovered it was in the 600s.  Patient states he has felt weak with dizziness and nausea since yesterday.  Blood glucose in triage shows \"high\" on glucometer.    Patient presents with hyperglycemia.  He does not appear to have  small respirations however given his elevated glucose will  obtain broad laboratory workup to rule out DKA or electrolyte disturbances.  He was given IV fluids.  Suspect medication noncompliance as a cause of his hyperglycemia.  He is also have increasing urinary frequency will obtain a urinalysis as well as a chest x-ray to rule out infectious etiology.      ED Course as of 01/19/25 0118   Thu Jan 16, 2025   1437 Labs today the patient's glucose is 700.  CO2 is 23 and his potassium is 5.2.  Hyperglycemia.  Ketones were mildly elevated however he is not in acidosis.  Still waiting UA as he is having some urinary frequency.  Will also obtain a chest x-ray given cough.  Hyperglycemia is likely secondary to medication noncompliance.  Will give insulin and reassess.   1508 Pseudohyponatremia sodium 126.  UA does not show sign of infection.  He has hyperglycemia likely secondary to medication " noncompliance.  Discussed with pharmacy who ordered 10 units of IV insulin.  Will continue to observe and treat patient.  Hopefully can get patient home but may need admission for glucose management.       Not Applicable    The patient was signed out to my colleague, Dr. Samson, at the end of shift.  Reassessment of glucose and ultimate disposition pending.    At the conclusion of the encounter I discussed the results of all of the tests and the disposition. The questions were answered. The patient or family acknowledged understanding and was agreeable with the care plan.     MEDICATIONS GIVEN IN THE EMERGENCY:  Medications   sodium chloride 0.9 % infusion (0 mLs Intravenous Stopped 1/17/25 1100)   sodium chloride 0.9% BOLUS 1,000 mL (0 mLs Intravenous Stopped 1/16/25 1528)   insulin regular 1 unit/mL injection 7 Units (7 Units Intravenous $Given 1/16/25 1526)   insulin glargine (LANTUS PEN) injection 30 Units (30 Units Subcutaneous $Given 1/16/25 1704)   insulin glargine (LANTUS PEN) injection 10 Units (10 Units Subcutaneous $Given 1/16/25 2240)   insulin glargine (LANTUS PEN) injection 5 Units (5 Units Subcutaneous $Given 1/17/25 0048)       NEW PRESCRIPTIONS STARTED AT TODAY'S ER VISIT  Discharge Medication List as of 1/17/2025  2:55 PM        Discharge Medication List as of 1/17/2025  2:55 PM        CONTINUE these medications which have CHANGED    Details   !! insulin glargine (LANTUS PEN) 100 UNIT/ML pen Inject 10 Units subcutaneously at bedtime., No Print OutIf Lantus is not covered by insurance, may substitute Basaglar or Semglee or other insulin glargine product per insurance preference at same dose and frequency.         !! - Potential duplicate medications found. Please discuss with provider.          =================================================================    HPI  Use of : hmong  used        Dee Sheikh is a 85 year old male with a pertinent history of diabetes presenting with  "concern of hyperglycemia.  Patient states he did not take his Lantus long-acting last night and today checked his blood glucose and it was high.  He has been having dizziness lightheadedness increased urinary frequency but denies abdominal pain, nausea, vomiting, chest pain or shortness of breath.      PHYSICAL EXAM    /63   Pulse 73   Temp 97.3  F (36.3  C) (Oral)   Resp 22   Ht 1.651 m (5' 5\")   Wt 59 kg (130 lb)   SpO2 99%   BMI 21.63 kg/m    Constitutional: Comfortable appearing.  Head: Normocephalic, atraumatic, mucous membranes dry, nose normal.   Neck: Supple, gross ROM intact.   Eyes: Pupils mid-range, sclera white.  Respiratory: Clear to auscultation bilaterally, no respiratory distress, no wheezing, speaks full sentences easily.  Cardiovascular: Normal heart rate, regular rhythm, no murmurs. No lower extremity edema.   GI: Soft, no tenderness to deep palpation in all quadrants.  Musculoskeletal: Moving all 4 extremities intentionally and without pain. No obvious deformity.  Skin: Warm, dry, no rash.  Neurologic: Alert & oriented x 3, speech clear, moving all extremities spontaneously   Psychiatric: Affect normal, cooperative.        LAB:  All pertinent labs reviewed and interpreted.  Results for orders placed or performed during the hospital encounter of 01/16/25   XR Chest 1 View    Impression    IMPRESSION: Negative chest. Lungs clear.   Basic metabolic panel   Result Value Ref Range    Sodium 126 (L) 135 - 145 mmol/L    Potassium 5.2 3.4 - 5.3 mmol/L    Chloride 92 (L) 98 - 107 mmol/L    Carbon Dioxide (CO2) 23 22 - 29 mmol/L    Anion Gap 11 7 - 15 mmol/L    Urea Nitrogen 41.4 (H) 8.0 - 23.0 mg/dL    Creatinine 1.92 (H) 0.67 - 1.17 mg/dL    GFR Estimate 34 (L) >60 mL/min/1.73m2    Calcium 9.5 8.8 - 10.4 mg/dL    Glucose 720 (HH) 70 - 99 mg/dL   UA with Microscopic reflex to Culture    Specimen: Urine, NOS   Result Value Ref Range    Color Urine Colorless Colorless, Straw, Light Yellow, " Yellow    Appearance Urine Clear Clear    Glucose Urine >1000 (A) Negative mg/dL    Bilirubin Urine Negative Negative    Ketones Urine Trace (A) Negative mg/dL    Specific Gravity Urine 1.024 1.001 - 1.030    Blood Urine 0.03 mg/dL (A) Negative    pH Urine 6.0 5.0 - 7.0    Protein Albumin Urine 30 (A) Negative mg/dL    Urobilinogen Urine <2.0 <2.0 mg/dL    Nitrite Urine Negative Negative    Leukocyte Esterase Urine Negative Negative    RBC Urine 1 <=2 /HPF    WBC Urine <1 <=5 /HPF   Glucose by meter   Result Value Ref Range    GLUCOSE BY METER POCT >600 (HH) 70 - 99 mg/dL   Blood gas venous   Result Value Ref Range    pH Venous 7.32 7.32 - 7.43    pCO2 Venous 53 (H) 40 - 50 mm Hg    pO2 Venous 17 (L) 25 - 47 mm Hg    Bicarbonate Venous 27 21 - 28 mmol/L    Base Excess/Deficit Venous 0.0 -3.0 - 3.0 mmol/L    FIO2 21     Oxyhemoglobin Venous 22 (L) 70 - 75 %    O2 Sat, Venous 22.7 (L) 70.0 - 75.0 %   Ketone Beta-Hydroxybutyrate Quantitative   Result Value Ref Range    Ketone (Beta-Hydroxybutyrate) Quantitative 0.74 (H) <=0.30 mmol/L   CBC with platelets and differential   Result Value Ref Range    WBC Count 7.2 4.0 - 11.0 10e3/uL    RBC Count 5.05 4.40 - 5.90 10e6/uL    Hemoglobin 13.6 13.3 - 17.7 g/dL    Hematocrit 39.9 (L) 40.0 - 53.0 %    MCV 79 78 - 100 fL    MCH 26.9 26.5 - 33.0 pg    MCHC 34.1 31.5 - 36.5 g/dL    RDW 13.0 10.0 - 15.0 %    Platelet Count 157 150 - 450 10e3/uL    % Neutrophils 64 %    % Lymphocytes 27 %    % Monocytes 6 %    % Eosinophils 2 %    % Basophils 1 %    % Immature Granulocytes 0 %    NRBCs per 100 WBC 0 <1 /100    Absolute Neutrophils 4.6 1.6 - 8.3 10e3/uL    Absolute Lymphocytes 2.0 0.8 - 5.3 10e3/uL    Absolute Monocytes 0.4 0.0 - 1.3 10e3/uL    Absolute Eosinophils 0.1 0.0 - 0.7 10e3/uL    Absolute Basophils 0.0 0.0 - 0.2 10e3/uL    Absolute Immature Granulocytes 0.0 <=0.4 10e3/uL    Absolute NRBCs 0.0 10e3/uL   Glucose by meter   Result Value Ref Range    GLUCOSE BY METER POCT >600  (HH) 70 - 99 mg/dL   Glucose by meter   Result Value Ref Range    GLUCOSE BY METER POCT 510 (HH) 70 - 99 mg/dL   Glucose by meter   Result Value Ref Range    GLUCOSE BY METER POCT 437 (H) 70 - 99 mg/dL   TSH with free T4 reflex   Result Value Ref Range    TSH 0.69 0.30 - 4.20 uIU/mL   Result Value Ref Range    Magnesium 2.3 1.7 - 2.3 mg/dL   Basic metabolic panel   Result Value Ref Range    Sodium 136 135 - 145 mmol/L    Potassium 4.4 3.4 - 5.3 mmol/L    Chloride 102 98 - 107 mmol/L    Carbon Dioxide (CO2) 20 (L) 22 - 29 mmol/L    Anion Gap 14 7 - 15 mmol/L    Urea Nitrogen 36.3 (H) 8.0 - 23.0 mg/dL    Creatinine 1.62 (H) 0.67 - 1.17 mg/dL    GFR Estimate 41 (L) >60 mL/min/1.73m2    Calcium 9.5 8.8 - 10.4 mg/dL    Glucose 402 (H) 70 - 99 mg/dL   Hemoglobin A1c   Result Value Ref Range    Estimated Average Glucose 217 (H) <117 mg/dL    Hemoglobin A1C 9.2 (H) <5.7 %   Glucose by meter   Result Value Ref Range    GLUCOSE BY METER POCT 387 (H) 70 - 99 mg/dL   Glucose by meter   Result Value Ref Range    GLUCOSE BY METER POCT 459 (H) 70 - 99 mg/dL   Basic metabolic panel   Result Value Ref Range    Sodium 139 135 - 145 mmol/L    Potassium 3.6 3.4 - 5.3 mmol/L    Chloride 105 98 - 107 mmol/L    Carbon Dioxide (CO2) 22 22 - 29 mmol/L    Anion Gap 12 7 - 15 mmol/L    Urea Nitrogen 33.9 (H) 8.0 - 23.0 mg/dL    Creatinine 1.64 (H) 0.67 - 1.17 mg/dL    GFR Estimate 41 (L) >60 mL/min/1.73m2    Calcium 9.7 8.8 - 10.4 mg/dL    Glucose 66 (L) 70 - 99 mg/dL   Hepatic panel   Result Value Ref Range    Protein Total 7.2 6.4 - 8.3 g/dL    Albumin 4.0 3.5 - 5.2 g/dL    Bilirubin Total 0.9 <=1.2 mg/dL    Alkaline Phosphatase 90 40 - 150 U/L    AST 18 0 - 45 U/L    ALT 15 0 - 70 U/L    Bilirubin Direct 0.20 0.00 - 0.30 mg/dL   CBC with platelets   Result Value Ref Range    WBC Count 10.1 4.0 - 11.0 10e3/uL    RBC Count 5.06 4.40 - 5.90 10e6/uL    Hemoglobin 13.7 13.3 - 17.7 g/dL    Hematocrit 39.1 (L) 40.0 - 53.0 %    MCV 77 (L) 78 - 100  fL    MCH 27.1 26.5 - 33.0 pg    MCHC 35.0 31.5 - 36.5 g/dL    RDW 13.0 10.0 - 15.0 %    Platelet Count 169 150 - 450 10e3/uL   Result Value Ref Range    Magnesium 2.3 1.7 - 2.3 mg/dL   Glucose by meter   Result Value Ref Range    GLUCOSE BY METER POCT 407 (H) 70 - 99 mg/dL   Result Value Ref Range    Troponin T, High Sensitivity 29 (H) <=22 ng/L   Result Value Ref Range    Troponin T, High Sensitivity 31 (H) <=22 ng/L   Glucose by meter   Result Value Ref Range    GLUCOSE BY METER POCT 74 70 - 99 mg/dL   Glucose by meter   Result Value Ref Range    GLUCOSE BY METER POCT 128 (H) 70 - 99 mg/dL   Glucose by meter   Result Value Ref Range    GLUCOSE BY METER POCT 98 70 - 99 mg/dL   Glucose by meter   Result Value Ref Range    GLUCOSE BY METER POCT 169 (H) 70 - 99 mg/dL       RADIOLOGY:  Reviewed all pertinent imaging. Please see official radiology report.  XR Chest 1 View   Final Result   IMPRESSION: Negative chest. Lungs clear.            Martir Mayorga MD  [facility] EMERGENCY DEPARTMENT  [facility] [facility] [id number] [phone]   =================================================================    BILLING:  Data  Category 1  Non-ED record review, if applicable. External record reviewed: N/A     Clinical information was obtained from an independent historian. History was obtained from: Patient     The following testing was considered but ultimately not selected after discussion with patient/family: N/A     Category 2  My independent interpretation of EKG, rhythm strip, radiology study: Chest x-ray did not reveal large pneumothorax     Category 3  Discussion of management with other physician/healthcare provider/other source: N/A       Risk  Prescription medication was considered, but ultimately not given after discussion with patient/family: N/A     Chronic conditions affecting care: Diabetes     Care significantly affected by Social Determinants of Health: N/A      Consideration of Admission/Observation: Escalation of care including admission/observation was considered given the complexity and risk of the patient's presenting complaint, exam findings, and/or their underlying comorbidities. However, at the end of my shift disposition was still undetermined. Please see oncoming providers note for disposition details.               Martir Mayorga MD  01/20/25 0117

## 2025-01-22 ENCOUNTER — TRANSFERRED RECORDS (OUTPATIENT)
Dept: HEALTH INFORMATION MANAGEMENT | Facility: CLINIC | Age: 85
End: 2025-01-22
Payer: COMMERCIAL

## 2025-01-22 ENCOUNTER — LAB REQUISITION (OUTPATIENT)
Dept: LAB | Facility: CLINIC | Age: 85
End: 2025-01-22

## 2025-01-22 DIAGNOSIS — R41.3 OTHER AMNESIA: ICD-10-CM

## 2025-01-22 LAB — FOLATE SERPL-MCNC: >40 NG/ML (ref 4.6–34.8)

## 2025-01-22 PROCEDURE — 86780 TREPONEMA PALLIDUM: CPT | Performed by: FAMILY MEDICINE

## 2025-01-22 PROCEDURE — 82746 ASSAY OF FOLIC ACID SERUM: CPT | Performed by: FAMILY MEDICINE

## 2025-01-22 PROCEDURE — 86618 LYME DISEASE ANTIBODY: CPT | Performed by: FAMILY MEDICINE

## 2025-01-22 PROCEDURE — 82607 VITAMIN B-12: CPT | Performed by: FAMILY MEDICINE

## 2025-01-22 PROCEDURE — 82565 ASSAY OF CREATININE: CPT | Performed by: FAMILY MEDICINE

## 2025-01-22 PROCEDURE — 84450 TRANSFERASE (AST) (SGOT): CPT | Performed by: FAMILY MEDICINE

## 2025-01-22 PROCEDURE — 86592 SYPHILIS TEST NON-TREP QUAL: CPT | Performed by: FAMILY MEDICINE

## 2025-01-22 PROCEDURE — 84425 ASSAY OF VITAMIN B-1: CPT | Performed by: FAMILY MEDICINE

## 2025-01-22 PROCEDURE — 87389 HIV-1 AG W/HIV-1&-2 AB AG IA: CPT | Performed by: FAMILY MEDICINE

## 2025-01-23 LAB
ALBUMIN SERPL BCG-MCNC: 4 G/DL (ref 3.5–5.2)
ALP SERPL-CCNC: 83 U/L (ref 40–150)
ALT SERPL W P-5'-P-CCNC: 14 U/L (ref 0–70)
ANION GAP SERPL CALCULATED.3IONS-SCNC: 10 MMOL/L (ref 7–15)
AST SERPL W P-5'-P-CCNC: 19 U/L (ref 0–45)
B BURGDOR IGG+IGM SER QL: 0.1
BILIRUB SERPL-MCNC: 0.6 MG/DL
BUN SERPL-MCNC: 30.6 MG/DL (ref 8–23)
CALCIUM SERPL-MCNC: 9.5 MG/DL (ref 8.8–10.4)
CHLORIDE SERPL-SCNC: 103 MMOL/L (ref 98–107)
CREAT SERPL-MCNC: 1.74 MG/DL (ref 0.67–1.17)
EGFRCR SERPLBLD CKD-EPI 2021: 38 ML/MIN/1.73M2
GLUCOSE SERPL-MCNC: 345 MG/DL (ref 70–99)
HCO3 SERPL-SCNC: 22 MMOL/L (ref 22–29)
HIV 1+2 AB+HIV1 P24 AG SERPL QL IA: NONREACTIVE
POTASSIUM SERPL-SCNC: 5.4 MMOL/L (ref 3.4–5.3)
PROT SERPL-MCNC: 7.1 G/DL (ref 6.4–8.3)
RPR SER QL: NONREACTIVE
SODIUM SERPL-SCNC: 135 MMOL/L (ref 135–145)
T PALLIDUM AB SER QL: REACTIVE
VIT B12 SERPL-MCNC: 532 PG/ML (ref 232–1245)

## 2025-01-25 LAB
T PALLIDUM AB SER QL AGGL: REACTIVE
VIT B1 PYROPHOSHATE BLD-SCNC: 200 NMOL/L

## 2025-01-27 ENCOUNTER — MEDICAL CORRESPONDENCE (OUTPATIENT)
Dept: HEALTH INFORMATION MANAGEMENT | Facility: CLINIC | Age: 85
End: 2025-01-27
Payer: COMMERCIAL

## 2025-01-27 LAB
ATRIAL RATE - MUSE: 68 BPM
DIASTOLIC BLOOD PRESSURE - MUSE: 74 MMHG
INTERPRETATION ECG - MUSE: NORMAL
P AXIS - MUSE: 61 DEGREES
PR INTERVAL - MUSE: 338 MS
QRS DURATION - MUSE: 88 MS
QT - MUSE: 408 MS
QTC - MUSE: 410 MS
R AXIS - MUSE: 52 DEGREES
SYSTOLIC BLOOD PRESSURE - MUSE: 157 MMHG
T AXIS - MUSE: 65 DEGREES
VENTRICULAR RATE- MUSE: 61 BPM

## 2025-01-29 ENCOUNTER — OFFICE VISIT (OUTPATIENT)
Dept: CARDIOLOGY | Facility: CLINIC | Age: 85
End: 2025-01-29
Payer: COMMERCIAL

## 2025-01-29 VITALS
WEIGHT: 132 LBS | RESPIRATION RATE: 18 BRPM | BODY MASS INDEX: 21.99 KG/M2 | SYSTOLIC BLOOD PRESSURE: 144 MMHG | DIASTOLIC BLOOD PRESSURE: 56 MMHG | HEIGHT: 65 IN | HEART RATE: 72 BPM

## 2025-01-29 DIAGNOSIS — R53.83 FATIGUE, UNSPECIFIED TYPE: ICD-10-CM

## 2025-01-29 DIAGNOSIS — I44.1 MOBITZ TYPE 1 SECOND DEGREE AV BLOCK: Primary | ICD-10-CM

## 2025-01-29 DIAGNOSIS — R00.1 BRADYCARDIA: ICD-10-CM

## 2025-01-29 DIAGNOSIS — E78.2 MIXED HYPERLIPIDEMIA: ICD-10-CM

## 2025-01-29 DIAGNOSIS — E11.10 TYPE 2 DIABETES MELLITUS WITH KETOACIDOSIS WITHOUT COMA, WITH LONG-TERM CURRENT USE OF INSULIN (H): ICD-10-CM

## 2025-01-29 DIAGNOSIS — Z79.4 TYPE 2 DIABETES MELLITUS WITH KETOACIDOSIS WITHOUT COMA, WITH LONG-TERM CURRENT USE OF INSULIN (H): ICD-10-CM

## 2025-01-29 PROCEDURE — T1013 SIGN LANG/ORAL INTERPRETER: HCPCS | Mod: GT | Performed by: INTERPRETER

## 2025-01-29 RX ORDER — SODIUM CHLORIDE 0.65 %
2 AEROSOL, SPRAY (ML) NASAL 4 TIMES DAILY
COMMUNITY

## 2025-01-29 RX ORDER — AMOXICILLIN 500 MG
1200 CAPSULE ORAL DAILY
COMMUNITY

## 2025-01-29 NOTE — LETTER
1/29/2025    Cathy Caballero MD  [address] [place] [address]    RE: Dee Sheikh       Dear Colleague,     [facility] the [facility].      Thank you, Dr. Cathy Monroe, for asking the [facility] team to see Mr. Dee Sheikh to follow-up on type I AV block.    Assessment/Recommendations   Assessment:    1.  Type I AV block, recently documented during hospitalization for treatment of severe hyperglycemia.  Patient with no symptoms of lightheadedness or near syncope.  No clear evidence of high-grade AV block, profound bradycardia or pauses during his 2-day hospitalization.  He did have tendency for low heart rates when he was at rest or asleep but with normal heart rates when he was active.  Again no indication during his recent hospitalization that permanent pacemaker was indicated.  I did bring up recommendation to repeat monitoring with a 3-day Zio patch.  I will then have him follow-up with our EP specialist to see whether they see anything more than the first-degree AV block noted.  2.  Fatigue, etiology unclear.  This could be related to his severe hyperglycemia and issues with blood sugar regulation.  Cannot entirely exclude cardiac etiology.  3.  Type 2 diabetes mellitus with poor blood sugar control  4.  Mixed hyperlipidemia with excellent LDL control      Plan:  1.  Continue current medications  2.  Arrange 3-day Zio patch with subsequent follow-up with the EP       History of Present Illness    Mr. Dee Sheikh is a 85 year old male with history of mixed hyperlipidemia, poorly controlled type 2 diabetes mellitus who was recently hospitalized at [facility] due to a blood sugar of 720 documented at home.  At the time of his ED evaluation, he was noted to have second-degree AV block and cardiac consult was requested.  When I talked with him and his son via an , he reported no symptoms of chest discomfort or shortness of breath.   "Also denied any lightheadedness, near syncope or true syncope.  In reviewing his rhythm strips, it appeared that he had type I second-degree AV block.  No clear evidence of high-grade AV block, prolonged pauses or bradycardia.  At that point told the patient and his family that I did not think a permanent pacemaker was indicated.    Since he was discharged 2 weeks ago, he has continued to have issues with blood sugar management.  Was seen back by primary care where he reported fatigue.  His son also reported low heart rates into the 40s when he was sleeping.  This prompts his visit today.  He again denies to me any symptoms of lightheadedness or near syncope.  No chest discomfort or shortness of breath.    ECG (personally reviewed): No ECG today    Cardiac Imaging Studies (personally reviewed): No additional imaging     Physical Examination Review of Systems   BP (!) 144/56 (BP Location: Right arm, Patient Position: Sitting, Cuff Size: Adult Small)   Pulse 72   Resp 18   Ht 1.651 m (5' 5\")   Wt 59.9 kg (132 lb)   BMI 21.97 kg/m    Body mass index is 21.97 kg/m .  Wt Readings from Last 3 Encounters:   01/29/25 59.9 kg (132 lb)   01/16/25 59 kg (130 lb)   11/30/22 63.1 kg (139 lb 1.6 oz)     General Appearance:   Awake, Alert, No acute distress.   HEENT:  No scleral icterus; the mucous membranes were pink and moist.   Neck: No cervical bruits or jugular venous distention    Chest: The spine was straight. The chest was symmetric.   Lungs:   Respirations unlabored; the lungs are clear to auscultation. No wheezing   Cardiovascular:   Mildly irregular rhythm.  S1, S2 normal.  No murmur or gallop   Abdomen:  No organomegaly, masses, bruits, or tenderness. Bowels sounds are present   Extremities: No peripheral edema   Skin: No xanthelasma. Warm, Dry.   Musculoskeletal: No tenderness.   Neurologic: Mood and affect are appropriate.    Encounter Vitals  BP: (!) 144/56  Pulse: 72  Resp: 18  Weight: 59.9 kg (132 " "lb)  Height: 165.1 cm (5' 5\")                                         Medical History  Surgical History Family History Social History   -Type 2 diabetes mellitus  -Mixed hyperlipidemia  -Type I second-degree AV block Past Surgical History:   Procedure Laterality Date     NO PAST SURGERIES      No family history on file. Social History     Socioeconomic History     Marital status:      Spouse name: Not on file     Number of children: Not on file     Years of education: Not on file     Highest education level: Not on file   Occupational History     Not on file   Tobacco Use     Smoking status: Never     Smokeless tobacco: Never   Vaping Use     Vaping status: Never Used   Substance and Sexual Activity     Alcohol use: No     Drug use: Not on file     Sexual activity: Not on file   Other Topics Concern     Not on file   Social History Narrative     Not on file     Social Drivers of Health     Financial Resource Strain: Not on file   Food Insecurity: Not on file   Transportation Needs: Not on file   Physical Activity: Not on file   Stress: Not on file   Social Connections: Not on file   Interpersonal Safety: Not on file   Housing Stability: Not on file          Medications  Allergies   Current Outpatient Medications   Medication Sig Dispense Refill     acetaminophen (TYLENOL) 650 MG CR tablet Take 650 mg by mouth every 8 hours as needed for pain       amLODIPine (NORVASC) 2.5 MG tablet Take 2.5 mg by mouth daily.       aspirin 81 MG EC tablet Take 81 mg by mouth daily        blood glucose monitoring (NO BRAND SPECIFIED) meter device kit Use to test blood sugar once daily       fluticasone (FLONASE) 50 MCG/ACT nasal spray Spray 1 spray into both nostrils daily       gabapentin (NEURONTIN) 100 MG capsule Take 100 mg by mouth 3 times daily       insulin glargine (LANTUS PEN) 100 UNIT/ML pen Inject 10 Units subcutaneously at bedtime.       insulin glargine (LANTUS PEN) 100 UNIT/ML pen Inject 34 Units subcutaneously " every morning.       metFORMIN (GLUCOPHAGE-XR) 750 MG 24 hr tablet Take 750 mg by mouth 2 times daily (with meals)        Multiple Vitamins-Minerals (MULTIVITAMIN ADULTS PO) Take 1 tablet by mouth daily       Omega-3 Fatty Acids (FISH OIL) 1200 MG capsule Take 1,200 mg by mouth daily.       polyethylene glycol (MIRALAX) 17 GM/Dose powder Take 1 capful by mouth daily as needed for constipation        pravastatin (PRAVACHOL) 20 MG tablet Take 20 mg by mouth daily        sitagliptin (JANUVIA) 50 MG tablet Take 50 mg by mouth daily.       sodium chloride (DEEP SEA NASAL SPRAY) 0.65 % nasal spray Spray 2 sprays in nostril 4 times daily.        Allergies   Allergen Reactions     Dulaglutide      Other Reaction(s): weight loss, malaise     Losartan      Other Reaction(s): paresthesias         Lab Results    Chemistry/lipid CBC Cardiac Enzymes/BNP/TSH/INR   Recent Labs   Lab Test 01/22/25  1158 09/16/24  0948 06/07/24  1026   TRIG  --   --  393*   LDL  --   --  26   BUN 30.6*   < > 30.3*      < > 136   CO2 22   < > 21*    < > = values in this interval not displayed.    Recent Labs   Lab Test 01/17/25  0343   WBC 10.1   HGB 13.7   HCT 39.1*   MCV 77*       Recent Labs   Lab Test 01/16/25  1402   TSH 0.69        A total of 43 minutes was spent reviewing patient's medical records, obtaining history and performing examination, as well as discussing diagnoses/ recommendations with patient and answering all questions.                      Thank you for allowing me to participate in the care of your patient.      Sincerely,     Caryn Polk MD     [facility]  cc:   Caryn Polk MD  [address] [facility], [address]  [place] [id number]       Initiate Treatment: Ketoconazole cream mixed with hydrocortisone 2.5% cr bid until flare is controlled on face\\nPatient declined ketoconazole shampoo rx, can try on otc dandruff shampoo that works best for her Detail Level: Zone Render In Strict Bullet Format?: No

## 2025-01-29 NOTE — NURSING NOTE
Dee Sheikh arrived here on 1/29/2025 1:50 PM for 3-7 Days  Zio monitor placement per ordering provider Caryn Polk MD for the diagnosis Mobitz type 1 second degree AV block and Bradycardia [R00.1]  Patient s skin was prepped per protocol. Dr. Caryn Polk is the supervising MD.  Zio monitor was placed.  Instructions were reviewed with and given to the patient.  Patient verbalized understanding of wear, troubleshooting and monitor return instructions.

## 2025-01-29 NOTE — PROGRESS NOTES
Thank you, Dr. Cathy Monroe, for asking the [facility] Heart Care team to see Mr. Dee Sheikh to follow-up on type I AV block.    Assessment/Recommendations   Assessment:    1.  Type I AV block, recently documented during hospitalization for treatment of severe hyperglycemia.  Patient with no symptoms of lightheadedness or near syncope.  No clear evidence of high-grade AV block, profound bradycardia or pauses during his 2-day hospitalization.  He did have tendency for low heart rates when he was at rest or asleep but with normal heart rates when he was active.  Again no indication during his recent hospitalization that permanent pacemaker was indicated.  I did bring up recommendation to repeat monitoring with a 3-day Zio patch.  I will then have him follow-up with our EP specialist to see whether they see anything more than the first-degree AV block noted.  2.  Fatigue, etiology unclear.  This could be related to his severe hyperglycemia and issues with blood sugar regulation.  Cannot entirely exclude cardiac etiology.  3.  Type 2 diabetes mellitus with poor blood sugar control  4.  Mixed hyperlipidemia with excellent LDL control      Plan:  1.  Continue current medications  2.  Arrange 3-day Zio patch with subsequent follow-up with the EP       History of Present Illness    Mr. Dee Sheikh is a 85 year old male with history of mixed hyperlipidemia, poorly controlled type 2 diabetes mellitus who was recently hospitalized at [facility] due to a blood sugar of 720 documented at home.  At the time of his ED evaluation, he was noted to have second-degree AV block and cardiac consult was requested.  When I talked with him and his son via an , he reported no symptoms of chest discomfort or shortness of breath.  Also denied any lightheadedness, near syncope or true syncope.  In reviewing his rhythm strips, it appeared that he had type I second-degree AV block.  No clear evidence of high-grade AV  "block, prolonged pauses or bradycardia.  At that point told the patient and his family that I did not think a permanent pacemaker was indicated.    Since he was discharged 2 weeks ago, he has continued to have issues with blood sugar management.  Was seen back by primary care where he reported fatigue.  His son also reported low heart rates into the 40s when he was sleeping.  This prompts his visit today.  He again denies to me any symptoms of lightheadedness or near syncope.  No chest discomfort or shortness of breath.    ECG (personally reviewed): No ECG today    Cardiac Imaging Studies (personally reviewed): No additional imaging     Physical Examination Review of Systems   BP (!) 144/56 (BP Location: Right arm, Patient Position: Sitting, Cuff Size: Adult Small)   Pulse 72   Resp 18   Ht 1.651 m (5' 5\")   Wt 59.9 kg (132 lb)   BMI 21.97 kg/m    Body mass index is 21.97 kg/m .  Wt Readings from Last 3 Encounters:   01/29/25 59.9 kg (132 lb)   01/16/25 59 kg (130 lb)   11/30/22 63.1 kg (139 lb 1.6 oz)     General Appearance:   Awake, Alert, No acute distress.   HEENT:  No scleral icterus; the mucous membranes were pink and moist.   Neck: No cervical bruits or jugular venous distention    Chest: The spine was straight. The chest was symmetric.   Lungs:   Respirations unlabored; the lungs are clear to auscultation. No wheezing   Cardiovascular:   Mildly irregular rhythm.  S1, S2 normal.  No murmur or gallop   Abdomen:  No organomegaly, masses, bruits, or tenderness. Bowels sounds are present   Extremities: No peripheral edema   Skin: No xanthelasma. Warm, Dry.   Musculoskeletal: No tenderness.   Neurologic: Mood and affect are appropriate.    Encounter Vitals  BP: (!) 144/56  Pulse: 72  Resp: 18  Weight: 59.9 kg (132 lb)  Height: 165.1 cm (5' 5\")                                         Medical History  Surgical History Family History Social History   -Type 2 diabetes mellitus  -Mixed hyperlipidemia  -Type I " second-degree AV block Past Surgical History:   Procedure Laterality Date    NO PAST SURGERIES      No family history on file. Social History     Socioeconomic History    Marital status:      Spouse name: Not on file    Number of children: Not on file    Years of education: Not on file    Highest education level: Not on file   Occupational History    Not on file   Tobacco Use    Smoking status: Never    Smokeless tobacco: Never   Vaping Use    Vaping status: Never Used   Substance and Sexual Activity    Alcohol use: No    Drug use: Not on file    Sexual activity: Not on file   Other Topics Concern    Not on file   Social History Narrative    Not on file     Social Drivers of Health     Financial Resource Strain: Not on file   Food Insecurity: Not on file   Transportation Needs: Not on file   Physical Activity: Not on file   Stress: Not on file   Social Connections: Not on file   Interpersonal Safety: Not on file   Housing Stability: Not on file          Medications  Allergies   Current Outpatient Medications   Medication Sig Dispense Refill    acetaminophen (TYLENOL) 650 MG CR tablet Take 650 mg by mouth every 8 hours as needed for pain      amLODIPine (NORVASC) 2.5 MG tablet Take 2.5 mg by mouth daily.      aspirin 81 MG EC tablet Take 81 mg by mouth daily       blood glucose monitoring (NO BRAND SPECIFIED) meter device kit Use to test blood sugar once daily      fluticasone (FLONASE) 50 MCG/ACT nasal spray Spray 1 spray into both nostrils daily      gabapentin (NEURONTIN) 100 MG capsule Take 100 mg by mouth 3 times daily      insulin glargine (LANTUS PEN) 100 UNIT/ML pen Inject 10 Units subcutaneously at bedtime.      insulin glargine (LANTUS PEN) 100 UNIT/ML pen Inject 34 Units subcutaneously every morning.      metFORMIN (GLUCOPHAGE-XR) 750 MG 24 hr tablet Take 750 mg by mouth 2 times daily (with meals)       Multiple Vitamins-Minerals (MULTIVITAMIN ADULTS PO) Take 1 tablet by mouth daily      Omega-3  Fatty Acids (FISH OIL) 1200 MG capsule Take 1,200 mg by mouth daily.      polyethylene glycol (MIRALAX) 17 GM/Dose powder Take 1 capful by mouth daily as needed for constipation       pravastatin (PRAVACHOL) 20 MG tablet Take 20 mg by mouth daily       sitagliptin (JANUVIA) 50 MG tablet Take 50 mg by mouth daily.      sodium chloride (DEEP SEA NASAL SPRAY) 0.65 % nasal spray Spray 2 sprays in nostril 4 times daily.        Allergies   Allergen Reactions    Dulaglutide      Other Reaction(s): weight loss, malaise    Losartan      Other Reaction(s): paresthesias         Lab Results    Chemistry/lipid CBC Cardiac Enzymes/BNP/TSH/INR   Recent Labs   Lab Test 01/22/25  1158 09/16/24  0948 06/07/24  1026   TRIG  --   --  393*   LDL  --   --  26   BUN 30.6*   < > 30.3*      < > 136   CO2 22   < > 21*    < > = values in this interval not displayed.    Recent Labs   Lab Test 01/17/25  0343   WBC 10.1   HGB 13.7   HCT 39.1*   MCV 77*       Recent Labs   Lab Test 01/16/25  1402   TSH 0.69        A total of 43 minutes was spent reviewing patient's medical records, obtaining history and performing examination, as well as discussing diagnoses/ recommendations with patient and answering all questions.

## 2025-01-29 NOTE — PATIENT INSTRUCTIONS
Continue current medications  3 day ziopatch to re-assess rhythm  Follow up visit with rhythm specialist in 1 month.

## 2025-02-06 LAB — CV ZIO PRELIM RESULTS: NORMAL

## 2025-02-18 ENCOUNTER — HOSPITAL ENCOUNTER (OUTPATIENT)
Facility: HOSPITAL | Age: 85
Setting detail: OBSERVATION
Discharge: HOME | End: 2025-02-19
Attending: EMERGENCY MEDICINE | Admitting: EMERGENCY MEDICINE
Payer: COMMERCIAL

## 2025-02-18 ENCOUNTER — APPOINTMENT (OUTPATIENT)
Dept: RADIOLOGY | Facility: HOSPITAL | Age: 85
End: 2025-02-18
Attending: FAMILY MEDICINE
Payer: COMMERCIAL

## 2025-02-18 DIAGNOSIS — I44.1 MOBITZ TYPE 1 SECOND DEGREE AV BLOCK: Primary | ICD-10-CM

## 2025-02-18 DIAGNOSIS — I10 BENIGN ESSENTIAL HYPERTENSION: ICD-10-CM

## 2025-02-18 DIAGNOSIS — I44.1 SECOND DEGREE AV BLOCK, MOBITZ TYPE I: ICD-10-CM

## 2025-02-18 DIAGNOSIS — Z79.4 TYPE 2 DIABETES MELLITUS WITH KETOACIDOSIS AND COMA, WITH LONG-TERM CURRENT USE OF INSULIN (H): ICD-10-CM

## 2025-02-18 DIAGNOSIS — R07.9 CHEST PAIN, UNSPECIFIED TYPE: ICD-10-CM

## 2025-02-18 DIAGNOSIS — E11.11 TYPE 2 DIABETES MELLITUS WITH KETOACIDOSIS AND COMA, WITH LONG-TERM CURRENT USE OF INSULIN (H): ICD-10-CM

## 2025-02-18 LAB
ANION GAP SERPL CALCULATED.3IONS-SCNC: 11 MMOL/L (ref 7–15)
BASOPHILS # BLD AUTO: 0 10E3/UL (ref 0–0.2)
BASOPHILS NFR BLD AUTO: 0 %
BUN SERPL-MCNC: 30.7 MG/DL (ref 8–23)
CALCIUM SERPL-MCNC: 9.8 MG/DL (ref 8.8–10.4)
CHLORIDE SERPL-SCNC: 103 MMOL/L (ref 98–107)
CREAT SERPL-MCNC: 1.83 MG/DL (ref 0.67–1.17)
D DIMER PPP FEU-MCNC: 0.42 UG/ML FEU (ref 0–0.5)
EGFRCR SERPLBLD CKD-EPI 2021: 36 ML/MIN/1.73M2
EOSINOPHIL # BLD AUTO: 0.4 10E3/UL (ref 0–0.7)
EOSINOPHIL NFR BLD AUTO: 5 %
ERYTHROCYTE [DISTWIDTH] IN BLOOD BY AUTOMATED COUNT: 13.7 % (ref 10–15)
FLUAV RNA SPEC QL NAA+PROBE: NEGATIVE
FLUBV RNA RESP QL NAA+PROBE: NEGATIVE
GLUCOSE SERPL-MCNC: 202 MG/DL (ref 70–99)
HCO3 SERPL-SCNC: 25 MMOL/L (ref 22–29)
HCT VFR BLD AUTO: 35.7 % (ref 40–53)
HGB BLD-MCNC: 12 G/DL (ref 13.3–17.7)
IMM GRANULOCYTES # BLD: 0.1 10E3/UL
IMM GRANULOCYTES NFR BLD: 1 %
LYMPHOCYTES # BLD AUTO: 1.9 10E3/UL (ref 0.8–5.3)
LYMPHOCYTES NFR BLD AUTO: 26 %
MAGNESIUM SERPL-MCNC: 1.7 MG/DL (ref 1.7–2.3)
MCH RBC QN AUTO: 27.6 PG (ref 26.5–33)
MCHC RBC AUTO-ENTMCNC: 33.6 G/DL (ref 31.5–36.5)
MCV RBC AUTO: 82 FL (ref 78–100)
MONOCYTES # BLD AUTO: 0.7 10E3/UL (ref 0–1.3)
MONOCYTES NFR BLD AUTO: 9 %
NEUTROPHILS # BLD AUTO: 4.2 10E3/UL (ref 1.6–8.3)
NEUTROPHILS NFR BLD AUTO: 58 %
NRBC # BLD AUTO: 0 10E3/UL
NRBC BLD AUTO-RTO: 0 /100
NT-PROBNP SERPL-MCNC: 162 PG/ML (ref 0–1800)
PLATELET # BLD AUTO: 158 10E3/UL (ref 150–450)
POTASSIUM SERPL-SCNC: 5.1 MMOL/L (ref 3.4–5.3)
RBC # BLD AUTO: 4.35 10E6/UL (ref 4.4–5.9)
RSV RNA SPEC NAA+PROBE: NEGATIVE
SARS-COV-2 RNA RESP QL NAA+PROBE: NEGATIVE
SODIUM SERPL-SCNC: 139 MMOL/L (ref 135–145)
TROPONIN T SERPL HS-MCNC: 35 NG/L
WBC # BLD AUTO: 7.2 10E3/UL (ref 4–11)

## 2025-02-18 PROCEDURE — 93005 ELECTROCARDIOGRAM TRACING: CPT | Performed by: STUDENT IN AN ORGANIZED HEALTH CARE EDUCATION/TRAINING PROGRAM

## 2025-02-18 PROCEDURE — 99223 1ST HOSP IP/OBS HIGH 75: CPT | Performed by: INTERNAL MEDICINE

## 2025-02-18 PROCEDURE — 93005 ELECTROCARDIOGRAM TRACING: CPT | Performed by: EMERGENCY MEDICINE

## 2025-02-18 PROCEDURE — 85041 AUTOMATED RBC COUNT: CPT | Performed by: FAMILY MEDICINE

## 2025-02-18 PROCEDURE — 71046 X-RAY EXAM CHEST 2 VIEWS: CPT

## 2025-02-18 PROCEDURE — 83880 ASSAY OF NATRIURETIC PEPTIDE: CPT | Performed by: FAMILY MEDICINE

## 2025-02-18 PROCEDURE — 99285 EMERGENCY DEPT VISIT HI MDM: CPT | Mod: 25

## 2025-02-18 PROCEDURE — 85014 HEMATOCRIT: CPT | Performed by: FAMILY MEDICINE

## 2025-02-18 PROCEDURE — 85025 COMPLETE CBC W/AUTO DIFF WBC: CPT | Performed by: FAMILY MEDICINE

## 2025-02-18 PROCEDURE — 82565 ASSAY OF CREATININE: CPT | Performed by: FAMILY MEDICINE

## 2025-02-18 PROCEDURE — 85379 FIBRIN DEGRADATION QUANT: CPT | Performed by: INTERNAL MEDICINE

## 2025-02-18 PROCEDURE — 36415 COLL VENOUS BLD VENIPUNCTURE: CPT | Performed by: FAMILY MEDICINE

## 2025-02-18 PROCEDURE — G0378 HOSPITAL OBSERVATION PER HR: HCPCS

## 2025-02-18 PROCEDURE — 83735 ASSAY OF MAGNESIUM: CPT | Performed by: FAMILY MEDICINE

## 2025-02-18 PROCEDURE — 84484 ASSAY OF TROPONIN QUANT: CPT | Performed by: INTERNAL MEDICINE

## 2025-02-18 PROCEDURE — 84484 ASSAY OF TROPONIN QUANT: CPT | Performed by: FAMILY MEDICINE

## 2025-02-18 PROCEDURE — 120N000001 HC R&B MED SURG/OB

## 2025-02-18 PROCEDURE — 80048 BASIC METABOLIC PNL TOTAL CA: CPT | Performed by: FAMILY MEDICINE

## 2025-02-18 PROCEDURE — 36415 COLL VENOUS BLD VENIPUNCTURE: CPT | Performed by: INTERNAL MEDICINE

## 2025-02-18 PROCEDURE — 93005 ELECTROCARDIOGRAM TRACING: CPT

## 2025-02-18 PROCEDURE — 87637 SARSCOV2&INF A&B&RSV AMP PRB: CPT | Performed by: EMERGENCY MEDICINE

## 2025-02-18 RX ORDER — AMOXICILLIN 250 MG
1 CAPSULE ORAL 2 TIMES DAILY
Status: DISCONTINUED | OUTPATIENT
Start: 2025-02-18 | End: 2025-02-18

## 2025-02-18 RX ORDER — CALCIUM CARBONATE 500 MG/1
1000 TABLET, CHEWABLE ORAL 4 TIMES DAILY PRN
Status: DISCONTINUED | OUTPATIENT
Start: 2025-02-18 | End: 2025-02-19 | Stop reason: HOSPADM

## 2025-02-18 RX ORDER — ONDANSETRON 2 MG/ML
4 INJECTION INTRAMUSCULAR; INTRAVENOUS EVERY 6 HOURS PRN
Status: DISCONTINUED | OUTPATIENT
Start: 2025-02-18 | End: 2025-02-19 | Stop reason: HOSPADM

## 2025-02-18 RX ORDER — AMOXICILLIN 250 MG
2 CAPSULE ORAL 2 TIMES DAILY
Status: DISCONTINUED | OUTPATIENT
Start: 2025-02-18 | End: 2025-02-18

## 2025-02-18 RX ORDER — ONDANSETRON 4 MG/1
4 TABLET, ORALLY DISINTEGRATING ORAL EVERY 6 HOURS PRN
Status: DISCONTINUED | OUTPATIENT
Start: 2025-02-18 | End: 2025-02-19 | Stop reason: HOSPADM

## 2025-02-18 RX ORDER — LIDOCAINE 40 MG/G
CREAM TOPICAL
Status: DISCONTINUED | OUTPATIENT
Start: 2025-02-18 | End: 2025-02-19 | Stop reason: HOSPADM

## 2025-02-18 ASSESSMENT — COLUMBIA-SUICIDE SEVERITY RATING SCALE - C-SSRS
6. HAVE YOU EVER DONE ANYTHING, STARTED TO DO ANYTHING, OR PREPARED TO DO ANYTHING TO END YOUR LIFE?: NO
1. IN THE PAST MONTH, HAVE YOU WISHED YOU WERE DEAD OR WISHED YOU COULD GO TO SLEEP AND NOT WAKE UP?: NO
2. HAVE YOU ACTUALLY HAD ANY THOUGHTS OF KILLING YOURSELF IN THE PAST MONTH?: NO

## 2025-02-18 ASSESSMENT — ACTIVITIES OF DAILY LIVING (ADL): ADLS_ACUITY_SCORE: 42

## 2025-02-19 VITALS
DIASTOLIC BLOOD PRESSURE: 84 MMHG | SYSTOLIC BLOOD PRESSURE: 148 MMHG | TEMPERATURE: 98.4 F | RESPIRATION RATE: 22 BRPM | HEART RATE: 74 BPM | BODY MASS INDEX: 23.23 KG/M2 | WEIGHT: 139.6 LBS | OXYGEN SATURATION: 98 %

## 2025-02-19 PROBLEM — I10 BENIGN ESSENTIAL HYPERTENSION: Status: ACTIVE | Noted: 2025-02-19

## 2025-02-19 PROBLEM — Z79.4: Status: ACTIVE | Noted: 2025-02-19

## 2025-02-19 PROBLEM — E78.5 HYPERLIPIDEMIA LDL GOAL <100: Status: ACTIVE | Noted: 2025-02-19

## 2025-02-19 PROBLEM — E11.9 TYPE 2 DIABETES MELLITUS (H): Status: ACTIVE | Noted: 2025-02-19

## 2025-02-19 PROBLEM — E11.11: Status: ACTIVE | Noted: 2025-02-19

## 2025-02-19 LAB
ALBUMIN SERPL BCG-MCNC: 4 G/DL (ref 3.5–5.2)
ALP SERPL-CCNC: 82 U/L (ref 40–150)
ALT SERPL W P-5'-P-CCNC: 16 U/L (ref 0–70)
ANION GAP SERPL CALCULATED.3IONS-SCNC: 9 MMOL/L (ref 7–15)
AST SERPL W P-5'-P-CCNC: 19 U/L (ref 0–45)
BASOPHILS # BLD AUTO: 0 10E3/UL (ref 0–0.2)
BASOPHILS NFR BLD AUTO: 0 %
BILIRUB SERPL-MCNC: 0.5 MG/DL
BUN SERPL-MCNC: 30.6 MG/DL (ref 8–23)
CALCIUM SERPL-MCNC: 9.8 MG/DL (ref 8.8–10.4)
CHLORIDE SERPL-SCNC: 104 MMOL/L (ref 98–107)
CREAT SERPL-MCNC: 1.66 MG/DL (ref 0.67–1.17)
D DIMER PPP FEU-MCNC: 0.47 UG/ML FEU (ref 0–0.5)
EGFRCR SERPLBLD CKD-EPI 2021: 40 ML/MIN/1.73M2
EOSINOPHIL # BLD AUTO: 0.4 10E3/UL (ref 0–0.7)
EOSINOPHIL NFR BLD AUTO: 5 %
ERYTHROCYTE [DISTWIDTH] IN BLOOD BY AUTOMATED COUNT: 13.7 % (ref 10–15)
GLUCOSE BLDC GLUCOMTR-MCNC: 118 MG/DL (ref 70–99)
GLUCOSE BLDC GLUCOMTR-MCNC: 119 MG/DL (ref 70–99)
GLUCOSE BLDC GLUCOMTR-MCNC: 144 MG/DL (ref 70–99)
GLUCOSE SERPL-MCNC: 140 MG/DL (ref 70–99)
HCO3 SERPL-SCNC: 26 MMOL/L (ref 22–29)
HCT VFR BLD AUTO: 33.7 % (ref 40–53)
HGB BLD-MCNC: 11.4 G/DL (ref 13.3–17.7)
IMM GRANULOCYTES # BLD: 0 10E3/UL
IMM GRANULOCYTES NFR BLD: 0 %
LYMPHOCYTES # BLD AUTO: 1.7 10E3/UL (ref 0.8–5.3)
LYMPHOCYTES NFR BLD AUTO: 21 %
MCH RBC QN AUTO: 27.4 PG (ref 26.5–33)
MCHC RBC AUTO-ENTMCNC: 33.8 G/DL (ref 31.5–36.5)
MCV RBC AUTO: 81 FL (ref 78–100)
MONOCYTES # BLD AUTO: 0.7 10E3/UL (ref 0–1.3)
MONOCYTES NFR BLD AUTO: 8 %
NEUTROPHILS # BLD AUTO: 5.3 10E3/UL (ref 1.6–8.3)
NEUTROPHILS NFR BLD AUTO: 65 %
NRBC # BLD AUTO: 0 10E3/UL
NRBC BLD AUTO-RTO: 0 /100
NUC STRESS EJECTION FRACTION: 71 %
PLATELET # BLD AUTO: 148 10E3/UL (ref 150–450)
POTASSIUM SERPL-SCNC: 4.6 MMOL/L (ref 3.4–5.3)
PROCALCITONIN SERPL IA-MCNC: 0.07 NG/ML
PROT SERPL-MCNC: 7.3 G/DL (ref 6.4–8.3)
RBC # BLD AUTO: 4.16 10E6/UL (ref 4.4–5.9)
SODIUM SERPL-SCNC: 139 MMOL/L (ref 135–145)
STRESS ECHO TARGET HR: 135
TROPONIN T SERPL HS-MCNC: 24 NG/L
TROPONIN T SERPL HS-MCNC: 30 NG/L
TROPONIN T SERPL HS-MCNC: 32 NG/L
TROPONIN T SERPL HS-MCNC: 33 NG/L
WBC # BLD AUTO: 8.2 10E3/UL (ref 4–11)

## 2025-02-19 PROCEDURE — 93016 CV STRESS TEST SUPVJ ONLY: CPT | Performed by: INTERNAL MEDICINE

## 2025-02-19 PROCEDURE — G0378 HOSPITAL OBSERVATION PER HR: HCPCS

## 2025-02-19 PROCEDURE — 250N000011 HC RX IP 250 OP 636: Performed by: INTERNAL MEDICINE

## 2025-02-19 PROCEDURE — 85025 COMPLETE CBC W/AUTO DIFF WBC: CPT | Performed by: INTERNAL MEDICINE

## 2025-02-19 PROCEDURE — 84145 PROCALCITONIN (PCT): CPT | Performed by: INTERNAL MEDICINE

## 2025-02-19 PROCEDURE — 99254 IP/OBS CNSLTJ NEW/EST MOD 60: CPT | Performed by: INTERNAL MEDICINE

## 2025-02-19 PROCEDURE — 99239 HOSP IP/OBS DSCHRG MGMT >30: CPT | Performed by: INTERNAL MEDICINE

## 2025-02-19 PROCEDURE — 343N000001 HC RX 343 MED OP 636: Performed by: INTERNAL MEDICINE

## 2025-02-19 PROCEDURE — 96367 TX/PROPH/DG ADDL SEQ IV INF: CPT

## 2025-02-19 PROCEDURE — 84484 ASSAY OF TROPONIN QUANT: CPT | Performed by: INTERNAL MEDICINE

## 2025-02-19 PROCEDURE — 82962 GLUCOSE BLOOD TEST: CPT

## 2025-02-19 PROCEDURE — 36415 COLL VENOUS BLD VENIPUNCTURE: CPT | Performed by: INTERNAL MEDICINE

## 2025-02-19 PROCEDURE — 85379 FIBRIN DEGRADATION QUANT: CPT | Performed by: INTERNAL MEDICINE

## 2025-02-19 PROCEDURE — 96365 THER/PROPH/DIAG IV INF INIT: CPT | Mod: 59

## 2025-02-19 PROCEDURE — 99207 PR APP CREDIT; MD BILLING SHARED VISIT: CPT | Performed by: INTERNAL MEDICINE

## 2025-02-19 PROCEDURE — A9500 TC99M SESTAMIBI: HCPCS | Performed by: INTERNAL MEDICINE

## 2025-02-19 PROCEDURE — 258N000003 HC RX IP 258 OP 636: Performed by: INTERNAL MEDICINE

## 2025-02-19 PROCEDURE — 82040 ASSAY OF SERUM ALBUMIN: CPT | Performed by: INTERNAL MEDICINE

## 2025-02-19 PROCEDURE — 82565 ASSAY OF CREATININE: CPT | Performed by: INTERNAL MEDICINE

## 2025-02-19 PROCEDURE — 250N000012 HC RX MED GY IP 250 OP 636 PS 637: Performed by: INTERNAL MEDICINE

## 2025-02-19 PROCEDURE — 250N000013 HC RX MED GY IP 250 OP 250 PS 637: Performed by: INTERNAL MEDICINE

## 2025-02-19 RX ORDER — GABAPENTIN 100 MG/1
100 CAPSULE ORAL 3 TIMES DAILY
Status: DISCONTINUED | OUTPATIENT
Start: 2025-02-19 | End: 2025-02-19 | Stop reason: HOSPADM

## 2025-02-19 RX ORDER — NICOTINE POLACRILEX 4 MG
15-30 LOZENGE BUCCAL
Status: DISCONTINUED | OUTPATIENT
Start: 2025-02-19 | End: 2025-02-19 | Stop reason: HOSPADM

## 2025-02-19 RX ORDER — FLUTICASONE PROPIONATE 50 MCG
1 SPRAY, SUSPENSION (ML) NASAL DAILY
Status: DISCONTINUED | OUTPATIENT
Start: 2025-02-19 | End: 2025-02-19 | Stop reason: HOSPADM

## 2025-02-19 RX ORDER — DEXTROSE MONOHYDRATE 25 G/50ML
25-50 INJECTION, SOLUTION INTRAVENOUS
Status: DISCONTINUED | OUTPATIENT
Start: 2025-02-19 | End: 2025-02-19 | Stop reason: HOSPADM

## 2025-02-19 RX ORDER — THERA TABS 400 MCG
1 TAB ORAL DAILY
Status: DISCONTINUED | OUTPATIENT
Start: 2025-02-19 | End: 2025-02-19 | Stop reason: HOSPADM

## 2025-02-19 RX ORDER — ALBUTEROL SULFATE 0.83 MG/ML
2.5 SOLUTION RESPIRATORY (INHALATION)
Status: DISCONTINUED | OUTPATIENT
Start: 2025-02-19 | End: 2025-02-19 | Stop reason: HOSPADM

## 2025-02-19 RX ORDER — AMLODIPINE BESYLATE 2.5 MG/1
2.5 TABLET ORAL DAILY
Status: DISCONTINUED | OUTPATIENT
Start: 2025-02-19 | End: 2025-02-19 | Stop reason: HOSPADM

## 2025-02-19 RX ORDER — PRAVASTATIN SODIUM 20 MG
20 TABLET ORAL DAILY
Status: DISCONTINUED | OUTPATIENT
Start: 2025-02-19 | End: 2025-02-19 | Stop reason: HOSPADM

## 2025-02-19 RX ORDER — CAFFEINE 200 MG
200 TABLET ORAL
Status: DISCONTINUED | OUTPATIENT
Start: 2025-02-19 | End: 2025-02-19 | Stop reason: HOSPADM

## 2025-02-19 RX ORDER — ASPIRIN 81 MG/1
81 TABLET ORAL DAILY
Status: DISCONTINUED | OUTPATIENT
Start: 2025-02-19 | End: 2025-02-19 | Stop reason: HOSPADM

## 2025-02-19 RX ORDER — GUAIFENESIN 600 MG/1
600 TABLET, EXTENDED RELEASE ORAL 2 TIMES DAILY
Qty: 10 TABLET | Refills: 0 | Status: SHIPPED | OUTPATIENT
Start: 2025-02-19

## 2025-02-19 RX ORDER — IPRATROPIUM BROMIDE AND ALBUTEROL SULFATE 2.5; .5 MG/3ML; MG/3ML
3 SOLUTION RESPIRATORY (INHALATION) EVERY 4 HOURS PRN
Status: DISCONTINUED | OUTPATIENT
Start: 2025-02-19 | End: 2025-02-19 | Stop reason: HOSPADM

## 2025-02-19 RX ORDER — AMINOPHYLLINE 25 MG/ML
50-100 INJECTION, SOLUTION INTRAVENOUS
Status: DISCONTINUED | OUTPATIENT
Start: 2025-02-19 | End: 2025-02-19 | Stop reason: HOSPADM

## 2025-02-19 RX ORDER — CEFTRIAXONE 1 G/1
1 INJECTION, POWDER, FOR SOLUTION INTRAMUSCULAR; INTRAVENOUS EVERY 24 HOURS
Status: DISCONTINUED | OUTPATIENT
Start: 2025-02-19 | End: 2025-02-19 | Stop reason: HOSPADM

## 2025-02-19 RX ORDER — AMLODIPINE BESYLATE 5 MG/1
5 TABLET ORAL DAILY
Qty: 30 TABLET | Refills: 0 | Status: SHIPPED | OUTPATIENT
Start: 2025-02-19

## 2025-02-19 RX ORDER — AZITHROMYCIN 250 MG/1
250 TABLET, FILM COATED ORAL DAILY
Qty: 4 TABLET | Refills: 0 | Status: SHIPPED | OUTPATIENT
Start: 2025-02-20 | End: 2025-02-24

## 2025-02-19 RX ORDER — GUAIFENESIN 600 MG/1
600 TABLET, EXTENDED RELEASE ORAL 2 TIMES DAILY
Status: DISCONTINUED | OUTPATIENT
Start: 2025-02-19 | End: 2025-02-19 | Stop reason: HOSPADM

## 2025-02-19 RX ORDER — POLYETHYLENE GLYCOL 3350 17 G/17G
17 POWDER, FOR SOLUTION ORAL DAILY PRN
Status: DISCONTINUED | OUTPATIENT
Start: 2025-02-19 | End: 2025-02-19 | Stop reason: HOSPADM

## 2025-02-19 RX ORDER — REGADENOSON 0.08 MG/ML
0.4 INJECTION, SOLUTION INTRAVENOUS ONCE
Status: COMPLETED | OUTPATIENT
Start: 2025-02-19 | End: 2025-02-19

## 2025-02-19 RX ORDER — CAFFEINE CITRATE 20 MG/ML
60 SOLUTION INTRAVENOUS
Status: DISCONTINUED | OUTPATIENT
Start: 2025-02-19 | End: 2025-02-19 | Stop reason: HOSPADM

## 2025-02-19 RX ORDER — ACETAMINOPHEN 500 MG
500 TABLET ORAL EVERY 6 HOURS PRN
Status: DISCONTINUED | OUTPATIENT
Start: 2025-02-19 | End: 2025-02-19 | Stop reason: HOSPADM

## 2025-02-19 RX ORDER — AMOXICILLIN AND CLAVULANATE POTASSIUM 500; 125 MG/1; MG/1
1 TABLET, FILM COATED ORAL 2 TIMES DAILY
Qty: 10 TABLET | Refills: 0 | Status: SHIPPED | OUTPATIENT
Start: 2025-02-19

## 2025-02-19 RX ADMIN — Medication 33.9 MILLICURIE: at 12:29

## 2025-02-19 RX ADMIN — PRAVASTATIN SODIUM 20 MG: 20 TABLET ORAL at 08:24

## 2025-02-19 RX ADMIN — ACETAMINOPHEN 500 MG: 500 TABLET, FILM COATED ORAL at 04:42

## 2025-02-19 RX ADMIN — GABAPENTIN 100 MG: 100 CAPSULE ORAL at 08:24

## 2025-02-19 RX ADMIN — SITAGLIPTIN 50 MG: 25 TABLET, FILM COATED ORAL at 08:28

## 2025-02-19 RX ADMIN — CEFTRIAXONE SODIUM 1 G: 1 INJECTION, POWDER, FOR SOLUTION INTRAMUSCULAR; INTRAVENOUS at 13:27

## 2025-02-19 RX ADMIN — GABAPENTIN 100 MG: 100 CAPSULE ORAL at 13:27

## 2025-02-19 RX ADMIN — SALINE NASAL SPRAY 1 SPRAY: 1.5 SOLUTION NASAL at 05:31

## 2025-02-19 RX ADMIN — INSULIN GLARGINE 34 UNITS: 100 INJECTION, SOLUTION SUBCUTANEOUS at 08:26

## 2025-02-19 RX ADMIN — AMLODIPINE BESYLATE 2.5 MG: 2.5 TABLET ORAL at 08:24

## 2025-02-19 RX ADMIN — Medication 8 MILLICURIE: at 11:17

## 2025-02-19 RX ADMIN — THERA TABS 1 TABLET: TAB at 08:24

## 2025-02-19 RX ADMIN — AZITHROMYCIN MONOHYDRATE 500 MG: 500 INJECTION, POWDER, LYOPHILIZED, FOR SOLUTION INTRAVENOUS at 13:45

## 2025-02-19 RX ADMIN — REGADENOSON 0.4 MG: 0.08 INJECTION, SOLUTION INTRAVENOUS at 12:12

## 2025-02-19 RX ADMIN — ASPIRIN 81 MG: 81 TABLET, COATED ORAL at 08:24

## 2025-02-19 RX ADMIN — FLUTICASONE PROPIONATE 1 SPRAY: 50 SPRAY, METERED NASAL at 08:25

## 2025-02-19 ASSESSMENT — ACTIVITIES OF DAILY LIVING (ADL)
ADLS_ACUITY_SCORE: 44

## 2025-02-19 NOTE — PROGRESS NOTES
Nuclear Medicine Lexiscan Stress Test:  Supine protocol.  0.4 mg IV lexiscan administered.  Peak VS:  HR: 57  BP: 148/64.  Symptoms:  Pt denied symptoms.  Nuclear imaging and interpretation pending.

## 2025-02-19 NOTE — CONSULTS
Cardiology Consult Note    Thank you, Dr. Padilla, for asking the [facility] Heart Care team to see Dee Sheikh in consultation at [facility] ED to evaluate chest pain, second-degree AV block.      Assessment:   1.  Chest pain, etiology unclear.  Describes onset of chest pain after coughing although chest pain persisted.  Could potentially be musculoskeletal.  ECG without acute ischemic changes.  Troponin levels flat and not consistent with acute coronary syndrome.  Given multiple risk factors for coronary artery disease, suggest pharmacologic nuclear stress test to exclude ischemia.  2.  Second-degree AV block, type I.  This was documented during hospitalization 6 weeks ago with subsequent event monitor showing no evidence of high-grade AV block, profound bradycardia or pauses.  No further treatment indicated.  3.  Essential hypertension, poorly controlled.  Would resume usual outpatient medications.  4.  Type 2 diabetes mellitus with poor control as evidenced by elevated hemoglobin A1c of 9.2    Clinically Significant Risk Factors Present on Admission                 # Drug Induced Platelet Defect: home medication list includes an antiplatelet medication   # Hypertension: Noted on problem list          # DMII: A1C = 9.2 % (Ref range: <5.7 %) within past 6 months        # Financial/Environmental Concerns:             Plan:   1.  Continue to avoid AV wilber blocking medications  2.  Schedule Maricruz scan with further recommendations to follow  3.  No treatment needed for type I second-degree AV block     Primary cardiologist: Dr. Caryn Polk     Current History:   Mr. Dee Sheikh is a 85 year old male with history of essential hypertension, hypercholesterolemia, type 2 diabetes mellitus, type I second-degree AV block who presented to the ED for evaluation of chest discomfort and coughing.  According to the notes, he described sharp constant chest pain in the left chest which began after coughing and  persisted even after the coughing was done. No history of diaphoresis, shortness of breath or nausea.  He checked his blood pressure at home and found it to be elevated and subsequently presented to the ED for evaluation.  Upon arrival to the ER, he was found to be quite hypertensive with blood pressures in the 200s over 100s.  ECG demonstrated normal sinus rhythm with first-degree AV block and intermittent type I second-degree AV block.  Initial troponin mildly elevated at 35 with repeat troponin of 33.  Now admitted for further evaluation.    Past Medical History:   -Hypertension  -Type 2 diabetes mellitus  -Hyperlipidemia  -Type I second-degree AV block    Past Surgical History:     Past Surgical History:   Procedure Laterality Date    NO PAST SURGERIES         Family History:   No family history of premature CAD    Social History:    reports that he has never smoked. He has never used smokeless tobacco. He reports that he does not drink alcohol.    Meds:     Current Facility-Administered Medications:     acetaminophen (TYLENOL) tablet 500 mg, 500 mg, Oral, Q6H PRN, Cari Padilla MD, 500 mg at 02/19/25 0442    amLODIPine (NORVASC) tablet 2.5 mg, 2.5 mg, Oral, Daily, Cari Padilla MD    aspirin EC tablet 81 mg, 81 mg, Oral, Daily, Cari Padilla MD    calcium carbonate (TUMS) chewable tablet 1,000 mg, 1,000 mg, Oral, 4x Daily PRN, Cari Padilla MD    glucose gel 15-30 g, 15-30 g, Oral, Q15 Min PRN **OR** dextrose 50 % injection 25-50 mL, 25-50 mL, Intravenous, Q15 Min PRN **OR** glucagon injection 1 mg, 1 mg, Subcutaneous, Q15 Min PRN, Cari Padilla MD    fluticasone (FLONASE) 50 MCG/ACT spray 1 spray, 1 spray, Both Nostrils, Daily, Cari Padilla MD    gabapentin (NEURONTIN) capsule 100 mg, 100 mg, Oral, TID, Cari Padilla MD    insulin aspart (NovoLOG) injection (RAPID ACTING), 1-3 Units, Subcutaneous, TID AC, Cari Padilla MD    insulin aspart (NovoLOG) injection (RAPID ACTING), 1-3 Units,  Subcutaneous, At Bedtime, Cari Padilla MD    insulin glargine (LANTUS PEN) injection 10 Units, 10 Units, Subcutaneous, At Bedtime, Cari Padilla MD    insulin glargine (LANTUS PEN) injection 34 Units, 34 Units, Subcutaneous, QAM, Cari Padilla MD    lidocaine (LMX4) cream, , Topical, Q1H PRN, Cari Padilla MD    lidocaine 1 % 0.1-1 mL, 0.1-1 mL, Other, Q1H PRN, Cari Padilla MD    multivitamin, therapeutic (THERA-VIT) tablet 1 tablet, 1 tablet, Oral, Daily, Cari Padilla MD    ondansetron (ZOFRAN ODT) ODT tab 4 mg, 4 mg, Oral, Q6H PRN **OR** ondansetron (ZOFRAN) injection 4 mg, 4 mg, Intravenous, Q6H PRN, Cari Padilla MD    Patient is already receiving mechanical prophylaxis, , Does not apply, Continuous PRN, Cari Padilla MD    polyethylene glycol (MIRALAX) Packet 17 g, 17 g, Oral, Daily PRN, Cari Padilla MD    pravastatin (PRAVACHOL) tablet 20 mg, 20 mg, Oral, Daily, Cari Padilla MD    sitagliptin (JANUVIA) tablet 50 mg, 50 mg, Oral, Daily, Cari Padilla MD    sodium chloride (OCEAN) 0.65 % nasal spray 1 spray, 1 spray, Both Nostrils, Q1H PRN, Cari Padilla MD, 1 spray at 02/19/25 0531    sodium chloride (PF) 0.9% PF flush 3 mL, 3 mL, Intracatheter, Q8H, Cari Padilla MD, 3 mL at 02/19/25 0017    sodium chloride (PF) 0.9% PF flush 3 mL, 3 mL, Intracatheter, q1 min prn, Cari Padilla MD    Current Outpatient Medications:     acetaminophen (TYLENOL) 650 MG CR tablet, Take 650 mg by mouth every 8 hours as needed for pain, Disp: , Rfl:     amLODIPine (NORVASC) 2.5 MG tablet, Take 2.5 mg by mouth daily., Disp: , Rfl:     aspirin 81 MG EC tablet, Take 81 mg by mouth daily , Disp: , Rfl:     blood glucose monitoring (NO BRAND SPECIFIED) meter device kit, Use to test blood sugar once daily, Disp: , Rfl:     fluticasone (FLONASE) 50 MCG/ACT nasal spray, Spray 1 spray into both nostrils daily, Disp: , Rfl:     gabapentin (NEURONTIN) 100 MG capsule, Take 100 mg by mouth 3 times daily, Disp: ,  Rfl:     insulin glargine (LANTUS PEN) 100 UNIT/ML pen, Inject 10 Units subcutaneously at bedtime., Disp: , Rfl:     insulin glargine (LANTUS PEN) 100 UNIT/ML pen, Inject 34 Units subcutaneously every morning., Disp: , Rfl:     metFORMIN (GLUCOPHAGE-XR) 750 MG 24 hr tablet, Take 750 mg by mouth 2 times daily (with meals) , Disp: , Rfl:     Multiple Vitamins-Minerals (MULTIVITAMIN ADULTS PO), Take 1 tablet by mouth daily, Disp: , Rfl:     Omega-3 Fatty Acids (FISH OIL) 1200 MG capsule, Take 1,200 mg by mouth daily., Disp: , Rfl:     polyethylene glycol (MIRALAX) 17 GM/Dose powder, Take 1 capful by mouth daily as needed for constipation , Disp: , Rfl:     pravastatin (PRAVACHOL) 20 MG tablet, Take 20 mg by mouth daily , Disp: , Rfl:     sitagliptin (JANUVIA) 50 MG tablet, Take 50 mg by mouth daily., Disp: , Rfl:     sodium chloride (DEEP SEA NASAL SPRAY) 0.65 % nasal spray, Spray 2 sprays in nostril 4 times daily., Disp: , Rfl:   Current Facility-Administered Medications   Medication Dose Route Frequency Provider Last Rate Last Admin    amLODIPine (NORVASC) tablet 2.5 mg  2.5 mg Oral Daily Cari Padilla MD        aspirin EC tablet 81 mg  81 mg Oral Daily Cari Padilla MD        fluticasone (FLONASE) 50 MCG/ACT spray 1 spray  1 spray Both Nostrils Daily Cari Padilla MD        gabapentin (NEURONTIN) capsule 100 mg  100 mg Oral TID Cari Padilla MD        insulin aspart (NovoLOG) injection (RAPID ACTING)  1-3 Units Subcutaneous TID AC Cari Padilla MD        insulin aspart (NovoLOG) injection (RAPID ACTING)  1-3 Units Subcutaneous At Bedtime Cari Padilla MD        insulin glargine (LANTUS PEN) injection 10 Units  10 Units Subcutaneous At Bedtime Cari Padilla MD        insulin glargine (LANTUS PEN) injection 34 Units  34 Units Subcutaneous QAM Cari Padilla MD        multivitamin, therapeutic (THERA-VIT) tablet 1 tablet  1 tablet Oral Daily Cari Padilla MD        pravastatin (PRAVACHOL) tablet 20 mg   20 mg Oral Daily Cari Padilla MD        sitagliptin (JANUVIA) tablet 50 mg  50 mg Oral Daily Cari Padilla MD        sodium chloride (PF) 0.9% PF flush 3 mL  3 mL Intracatheter Q8H Cari Padilla MD   3 mL at 02/19/25 0017       Allergies:   Dulaglutide and Losartan    Review of Systems:   A 12 point comprehensive review of systems was negative except as noted in the current history.    Objective:      Physical Exam  Body mass index is 23.23 kg/m .  BP (!) 157/96 (Patient Position: Semi-[place]'s, Cuff Size: Adult Regular)   Pulse 67   Temp 98.2  F (36.8  C) (Oral)   Resp 22   Wt 63.3 kg (139 lb 9.6 oz)   SpO2 96%   BMI 23.23 kg/m      General Appearance:   Well-developed, well-nourished elderly  male in no acute distress   HEENT:  Normocephalic, atraumatic.  Sclera nonicteric.  Mucous membranes moist   Neck: No jugular venous distention or carotid bruit   Chest: Symmetric with normal AP diameter   Lungs:   Clear to auscultation bilaterally   Cardiovascular:   Regular rhythm.  S1, S2 normal.  No murmur or gallop   Abdomen:  Soft, nondistended, nontender.  No organomegaly or mass   Extremities: No peripheral edema, clubbing or cyanosis   Skin: No rash or lesions   Neurologic: Alert oriented x 3.  No gross focal deficit             Cardiographics (personally reviewed)  ECG on admission demonstrates sinus rhythm with first-degree AV block with nonconducted PAC.  No acute ischemic changes.  Repeat ECG demonstrates sinus rhythm with type I second-degree AV block.  No acute ischemic changes.    Imaging (personally reviewed)  Imaging currently pending.    Lab Review (personally reviewed all results)  Recent Labs   Lab Test 06/07/24  1026   CHOL 136   HDL 31*   LDL 26   TRIG 393*     Recent Labs   Lab Test 06/07/24  1026 02/17/23  0903 03/23/22  0928   LDL 26 55 48     Recent Labs   Lab Test 02/19/25  0430 02/18/25  2055   NA  --  139   POTASSIUM  --  5.1   CHLORIDE  --  103   CO2  --  25   * 202*  "  BUN  --  30.7*   CR  --  1.83*   GFRESTIMATED  --  36*   CHIQUITA  --  9.8     Recent Labs   Lab Test 02/18/25 2055 01/22/25  1158 01/17/25  0343   CR 1.83* 1.74* 1.64*     Recent Labs   Lab Test 01/16/25  1402   A1C 9.2*          Recent Labs   Lab Test 02/18/25 2055   WBC 7.2   HGB 12.0*   HCT 35.7*   MCV 82        Recent Labs   Lab Test 02/18/25 2055 01/17/25  0343 01/16/25  1402   HGB 12.0* 13.7 13.6    No results for input(s): \"TROPONINI\" in the last 50774 hours.  Recent Labs   Lab Test 02/18/25 2055   NTBNPI 162     Recent Labs   Lab Test 01/16/25  1402   TSH 0.69     No results for input(s): \"INR\" in the last 71800 hours.              "

## 2025-02-19 NOTE — H&P
[facility]    History and Physical - Hospitalist Service       Date of Admission:  (Not on file)    Assessment & Plan    Dee Sheikh is a 85 year old male with a medical history significant for type II DM, hypertension, hyperlipidemia, Mobitz type I second-degree AV block, and other medical comorbidities who is admitted with chest pain and cough requiring further assessment evaluation.      Patient Active Problem List   Diagnosis    Palpitations    Mobitz type 1 second degree AV block    Hyperglycemia    Second degree AV block, Mobitz type I    Chest pain, unspecified type    Type 2 diabetes mellitus (H)    Benign essential hypertension    Hyperlipidemia LDL goal <100         Chest Pain:  Assessment: Patient experiencing chest pain with underlying ACS and type 2 heart block  . Monitoring for potential arrhythmias or myocardial injury is crucial.  Plan:  Monitor cardiac rhythm closely with telemetry to detect any arrhythmias or worsening AV block.  Continue serial troponin measurements to assess for myocardial injury.  Consult cardiology for evaluation regarding the potential need for a pacemaker or other interventions.  Manage blood pressure with amlodipine and avoid BB. Hydralazine PRN  TTE in am  Check dimer. Am team to follow    Cough (Etiology Unclear):    Assessment: No infectious etiology identified on chest X-ray; patient is afebrile.  Plan:  Continue supportive care.  Monitor for any changes in symptoms or the development of new signs.    Symptomatic Bradycardia:  Assessment: Bradycardia requiring intervention.  Plan:  Pacemaker placement is indicated; appreciate cardiology's input on timing and specifics.  Continue trending troponin levels to monitor cardiac status.    Type 2 Diabetes Mellitus:    Assessment: Blood glucose requires regulation.  Plan:  Use sliding scale insulin.  Maintain blood sugar goal of 100-140 mg/dL.    Hypertension:    Assessment: Blood pressure management  needed.  Plan:  Resume outpatient antihypertensive medications, avoiding beta-blockers.  Monitor blood pressure closely and adjust medications as needed.    Hyperlipidemia:  Assessment: Elevated cholesterol levels.  Plan:  Continue statin therapy.  Other Medical Problems:      Diet:  type 2 DM  DVT Prophylaxis: Pneumatic Compression Devices  Gillespie Catheter: Not present  Lines: None     Cardiac Monitoring: None  Code Status:  Full code    Clinically Significant Risk Factors Present on Admission                 # Drug Induced Platelet Defect: home medication list includes an antiplatelet medication            # DMII: A1C = 9.2 % (Ref range: <5.7 %) within past 6 months        # Financial/Environmental Concerns:           Disposition Plan     Medically Ready for Discharge: Anticipated in 2-4 Days           Cari Padilla MD  Hospitalist Service  [facility]  Securely message with [employer] (more info)  Text page via [employer] Paging/Directory     ______________________________________________________________________    Chief Complaint   Chest pain          History of Present Illness   Dee Sheikh is an 85-year-old male with a medical history significant for type 2 diabetes mellitus, hypertension, hyperlipidemia, chronic kidney disease stage III, Mobitz type I second-degree AV block, and other medical co morbidities who is being admitted with chest pain for further evaluation.    Per history, he presented to the emergency room with complaints of chest pain and cough, requiring further assessment and evaluation. The patient information was obtained from both the patient and a family member, with an  used via phone in the ong language. Care everywhere was also reviewed for further medical information.     Per reports, around 5:00 PM on the day of presentation, Mr. Sheikh experienced a sharp, constant pain in his left chest while coughing, which persisted even when he was not coughing. He also  reported that his heart felt like it was beating slower than normal. He described coughing up white-colored phlegm but denied any fevers, abdominal pain, or syncopal episodes. He did note feeling sweaty with the pain and coughing.    Upon arrival at the emergency department, Mr. Sheikh's blood pressure was notably high at home, measuring in the 200s/100s. He denied any difficulty breathing. An initial EKG showed a heart rate of 58 beats per minute, consistent with his known Mobitz type I second-degree AV block. An initial troponin level was elevated at 35, prompting further cardiac evaluation.    A chest X-ray revealed minimal left basilar atelectasis but no evidence of pneumonia. Given the cardiac symptoms and elevated troponin, cardiology was consulted, and telemetry monitoring was recommended to observe his cardiac rhythm overnight.     It is noted that patient is known to have symptomatic bradycardia due to type 2 heart block. Discussions about a potential pacemaker had taken place previously, but the family had not yet made a decision regarding this intervention or had refused in the past.      Mr. Sheikh was admitted for further evaluation and management of chest pain in the context of his cardiac history, elevated troponin levels, and potential arrhythmia concerns.    Plan of Care:  Monitor cardiac rhythm closely with telemetry to assess for any arrhythmias or worsening AV block.  Continue serial troponin measurements to monitor for any changes indicative of myocardial injury.  Consult cardiology for further evaluation regarding the need for a pacemaker or other interventions.  Manage blood pressure with appropriate antihypertensive medications to prevent further cardiac strain.  Provide supportive care for cough, potentially with expectorants or antitussives as needed.  Monitor and manage blood glucose levels in the context of his diabetes.  Assess and manage kidney function, considering his CKD stage III  status.  Re-evaluate the patient's condition frequently and adjust the treatment plan as necessary based on his clinical progress.      Past Medical History    Hypertension  Hyperlipidemia.   Mobitiz type 1 second-degree AV block   type II DM  Memory problems  Chronic fatigue  CKD stage III  Symptomatic bradycardia  Peripheral neuropathy  ProblemNoted DateDiagnosed DateDiabetes Jrlrmpevb56/07/2016 HCD (health care directive)2015 Nuclear senile cataract of both eyes2015   Overview (2017):    Formatting of this note might be different from the original.  Cataract L>R  Encounter for long-term (current) use of [id number] Other abnormal clinical [id number] Mwcqnudfxfkbuynw90/07/2012 Hyperlipidemia with target LDL less than [id number]   Overview (2015):    Formatting of this note might be different from the original.  ICD 10  Diabetic kidney [id number] Type 2 diabetes mellitus, uncontrolled, with renal cfshmssxtmzrb95/05/2012     Medical History  Type 1 diabetes mellitus   Renal insufficiency    Hyperlipidemia (HRC)      Past Surgical History   Past Surgical History:   Procedure Laterality Date    NO PAST SURGERIES         Prior to Admission Medications   Cannot display prior to admission medications because the patient has not been admitted in this contact.        Review of Systems    The 10 point Review of Systems is negative other than noted in the HPI or here.     Social History   I have reviewed this patient's social history and updated it with pertinent information if needed.  Social History     Tobacco Use    Smoking status: Never    Smokeless tobacco: Never   Vaping Use    Vaping status: Never Used   Substance Use Topics    Alcohol use: No         Family History   Mother and father   Siblings are alive      Allergies   Allergies   Allergen Reactions    Dulaglutide      Other Reaction(s): weight loss, malaise    Losartan      Other Reaction(s): paresthesias         Physical Exam   Vital Signs: Temp: 98.6  F (37  C) Temp src: Oral BP: (!) 201/79 Pulse: 76   Resp: 16 SpO2: 98 % O2 Device: None (Room air)    Weight: 139 lbs 9.6 oz      General Aox3, appropriate affect, NAD, on RA  HEENT  MMM, EOMI, PERRL  Chest Adeq E b/l, No wheezing  Heart RRR, No M/R/G  Abd- Soft, NT, BS+  - Deferred,   Extremity- Moving all extremities, No digital clubbing,   No edema  Neuro- Aox3, moving all extremities    gait not checked  Skin  Has no tattoo, No skin rash     Medical Decision Making       85 MINUTES SPENT BY ME on the date of service doing chart review, history, exam, documentation & further activities per the note.      ------------------ MEDICAL DECISION MAKING ------------------------------------------------------------------------------------------------------  MANAGEMENT DISCUSSED with the following over the past 24 hours: patient and care team       Data   ------------------------- PAST 24 HR DATA REVIEWED -----------------------------------------------    I have personally reviewed the following data over the past 24 hrs:    7.2  \   12.0 (L)   / 158     139 103 30.7 (H) /  202 (H)   5.1 25 1.83 (H) \     Trop: 35 (H) BNP: 162       Imaging results reviewed over the past 24 hrs:   Recent Results (from the past 24 hours)   Chest XR,  PA & LAT    Narrative    EXAM: XR CHEST 2 VIEWS  LOCATION: [facility]  DATE: 2/18/2025    INDICATION: Dyspnea and chest pain.  COMPARISON: 1/16/2025.      Impression    IMPRESSION: No focal airspace consolidation. Minimal left basilar atelectasis. Mild pulmonary vascular redistribution. No pleural effusion or pneumothorax.    Mild cardiomegaly.    Healed fracture deformity of the midshaft left clavicle.

## 2025-02-19 NOTE — PROGRESS NOTES
[facility]    Medicine Progress Note - Hospitalist Service    Date of Admission:  2/18/2025    Assessment & Plan   Dee Sheikh is a-year-old woman with history of hypertension, hyperlipidemia, type 2 diabetes, type I second-degree AV block and stage III CKD admitted on 2/18/2025 with chest pain and cough.    ECG and troponin level did not suggest ACS. Lexiscan stress test was negative for inducible myocardial ischemia.  Report of echocardiogram is pending.    Patient endorsed cough productive of blood-tinged sputum.  CT scan of the chest revealed upper lung predominant tree-in-bud opacities and nonspecific for infectious/inflammatory bronchiolitis.       Suspected community-acquired pneumonia  Cough productive of blood tinged sputum  Suspected bronchiolitis  Ceftriaxone and azithromycin for now  DuoNebs as needed  Guaifenesin  Supplement oxygen as needed    Chest pain  Possibly pleuritic from pneumonia versus musculoskeletal  ECG and troponin level did not suggest ACS. Lexiscan stress test was negative for inducible myocardial ischemia.  Report of echocardiogram is pending.  Aspirin 81 mg daily  Pravastatin 20 mg daily  Cardiology [id number] assistance    Hypertension  Amlodipine 2.5 mg daily    Type 2 diabetes  Lantus 10 units at bedtime and 34 units every morning  Insulin sliding scale  Continue sitagliptin      Diet: Combination Diet Clear Liquid    DVT Prophylaxis: Pneumatic Compression Devices  Gillespie Catheter: Not present  Lines: None     Cardiac Monitoring: ACTIVE order. Indication: Tachyarrhythmias, acute (48 hours)  Code Status: Full Code      Clinically Significant Risk Factors Present on Admission                 # Drug Induced Platelet Defect: home medication list includes an antiplatelet medication   # Hypertension: Noted on problem list          # DMII: A1C = 9.2 % (Ref range: <5.7 %) within past 6 months        # Financial/Environmental Concerns:           Social Drivers  of Health            Disposition Plan     Medically Ready for Discharge: Anticipated Tomorrow      Ruthy Anderson MD  Hospitalist Service  [facility]  Securely message with [employer] (more info)  Text page via [employer] Paging/Directory   ______________________________________________________________________    Interval History   He complains of left-sided chest pain associated with cough productive of blood-tinged sputum.  Otherwise no acute issues. Daughter is visiting.     Physical Exam   Vital Signs: Temp: 98.4  F (36.9  C) Temp src: Oral BP: (!) 173/76 Pulse: 75   Resp: 21 SpO2: 97 % O2 Device: None (Room air)    Weight: 139 lbs 9.6 oz    General appearance: Awake, Alert, Cooperative, not in any obvious distress and appears stated age   HEENT: Normocephalic, atraumatic, conjunctiva clear without icterus and ears without discharge  Lungs: Mild wheezes with diminished air exchange.  Cardiovascular: Regular Rate and Rythm, normal apical impulse, normal S1 and S2, no lower extremity edema bilaterally  Abdomen: Soft, non-tender and Non-distended, active bowel sounds  Skin: Skin color, texture normal and bruising or bleeding. No rashes or lesions over face, neck, arms and legs, turgor normal.  Musculoskeletal: No bony deformities or joint tenderness. Normal ROM upon flexion & extension.   Neurologic: Alert & Oriented X 3, Facial symmetry preserved and upper & lower extremities moving well with symmetry  Psychiatric: Calm, normal eye contact and normal affect      Medical Decision Making       45 MINUTES SPENT BY ME on the date of service doing chart review, history, exam, documentation & further activities per the note.      Data     I have personally reviewed the following data over the past 24 hrs:    8.2  \   11.4 (L)   / 148 (L)     139 104 30.6 (H) /  140 (H)   4.6 26 1.66 (H) \     ALT: 16 AST: 19 AP: 82 TBILI: 0.5   ALB: 4.0 TOT PROTEIN: 7.3 LIPASE: N/A     Trop: 24 (H) BNP: 162     INR:   N/A PTT:  N/A   D-dimer:  0.47 Fibrinogen:  N/A       Imaging results reviewed over the past 24 hrs:   Recent Results (from the past 24 hours)   Chest XR,  PA & LAT    Narrative    EXAM: XR CHEST 2 VIEWS  LOCATION: [facility]  DATE: 2/18/2025    INDICATION: Dyspnea and chest pain.  COMPARISON: 1/16/2025.      Impression    IMPRESSION: No focal airspace consolidation. Minimal left basilar atelectasis. Mild pulmonary vascular redistribution. No pleural effusion or pneumothorax.    Mild cardiomegaly.    Healed fracture deformity of the midshaft left clavicle.   CT Chest w/o Contrast    Narrative    EXAM: CT CHEST W/O CONTRAST  LOCATION: [facility]  DATE: 2/19/2025    INDICATION: Cough with blood tinged sputum  COMPARISON: None.  TECHNIQUE: CT chest without IV contrast. Multiplanar reformats were obtained. Dose reduction techniques were used.  CONTRAST: None.    FINDINGS:   LUNGS AND PLEURA: The central airways are patent. Upper lung predominant tree-in-bud opacities. No areas of consolidation. Biapical scarring. No pleural effusion.    MEDIASTINUM/AXILLAE: Normal heart size. No pericardial effusion. No thoracic lymphadenopathy.    CORONARY ARTERY CALCIFICATION: Mild.    UPPER ABDOMEN: No acute findings.    MUSCULOSKELETAL: Degenerative changes of the spine. No worrisome bone lesions. Healed left clavicle fracture.      Impression    IMPRESSION:   Upper lung predominant tree-in-bud opacities, nonspecific for infectious/inflammatory bronchiolitis. No lobar consolidation.     NM Lexiscan stress test   Result Value    Target

## 2025-02-19 NOTE — DISCHARGE SUMMARY
[facility]  Hospitalist Discharge Summary      Date of Admission:  2/18/2025  Date of Discharge:  2/19/2025  Discharging Provider: Ruthy Anderosn MD  Discharge Service: Hospitalist Service    Discharge Diagnoses   Hypertension  Type I second-degree degree AV block  Stage III CKD  Suspected community-acquired pneumonia  Pleuritic chest pain    Clinically Significant Risk Factors     # DMII: A1C = 9.2 % (Ref range: <5.7 %) within past 6 months       Follow-ups Needed After Discharge   Follow-up Appointments       Hospital Follow-up with Existing Primary Care Provider (PCP)      Please see details below   Augmentin and azithromycin for the next 5 days    Schedule Primary Care visit within: 14 Days               Discharge Disposition   Discharged to home  Condition at discharge: Stable    Hospital Course   Dee Sheikh is 85-year-old woman with history of hypertension, hyperlipidemia, type 2 diabetes, type I second-degree AV block and stage III CKD admitted on 2/18/2025 with chest pain and cough productive of blood-tinged sputum.      CT scan of the chest revealed upper lung predominant tree-in-bud opacities and nonspecific for infectious/inflammatory bronchiolitis.  He was placed on ceftriaxone and azithromycin and subsequently transition to Augmentin and azithromycin at discharge for 5 days.    ECG and troponin level did not suggest ACS. Lexiscan stress test was negative for inducible myocardial ischemia.  Per cardiologist, symptoms not likely secondary to myocardial ischemia given negative stress test.    Patient is breathing comfortably on room air and he is clinically stable for discharge this time.    Consultations This Hospital Stay   CARDIOLOGY IP CONSULT    Code Status   Full Code    Time Spent on this Encounter   I, Ruthy Anderson MD, personally saw the patient today and spent greater than 30 minutes discharging this patient.       Ruthy Anderson MD  [facility]  [facility] EMERGENCY DEPARTMENT  [address] [place] [address]  Phone: [phone]  ______________________________________________________________________    Physical Exam   Vital Signs: Temp: 98  F (36.7  C) Temp src: Oral BP: (!) 163/75 Pulse: 71   Resp: 19 SpO2: 98 % O2 Device: None (Room air)    Weight: 139 lbs 9.6 oz      General appearance: Awake, Alert, Cooperative, not in any obvious distress and appears stated age   HEENT: Normocephalic, atraumatic, conjunctiva clear without icterus and ears without discharge  Lungs: Clear to auscultation bilaterally, no wheezing, good air exchange, normal work of breathing  Cardiovascular: Regular Rate and Rythm, normal apical impulse, normal S1 and S2, no lower extremity edema bilaterally  Abdomen: Soft, non-tender and Non-distended, active bowel sounds  Skin: Skin color, texture normal and bruising or bleeding. No rashes or lesions over face, neck, arms and legs, turgor normal.  Musculoskeletal: No bony deformities or joint tenderness. Normal ROM upon flexion & extension.   Neurologic: Alert & Oriented X 3, Facial symmetry preserved and upper & lower extremities moving well with symmetry  Psychiatric: Calm, normal eye contact and normal affect         Primary Care Physician   Cathy Caballero    Discharge Orders      Reason for your hospital stay    Hypertension  Type I second-degree AV block  Stage III CKD  Suspected community-acquired pneumonia  Pleuritic chest pain     Activity    Your activity upon discharge: activity as tolerated     Diet    Follow this diet upon discharge: Current Diet:Orders Placed This Encounter      Combination Diet Clear Liquid     Hospital Follow-up with Existing Primary Care Provider (PCP)    Please see details below   Augmentin and azithromycin for the next 5 days       Significant Results and Procedures   Most Recent 3 BMP's:  Recent Labs   Lab Test 02/19/25  1321 02/19/25  0849 02/19/25  0806 02/19/25  0430 02/18/25  0434  01/22/25  1158   NA  --  139  --   --  139 135   POTASSIUM  --  4.6  --   --  5.1 5.4*   CHLORIDE  --  104  --   --  103 103   CO2  --  26  --   --  25 22   BUN  --  30.6*  --   --  30.7* 30.6*   CR  --  1.66*  --   --  1.83* 1.74*   ANIONGAP  --  9  --   --  11 10   CHIQUITA  --  9.8  --   --  9.8 9.5   * 140* 119*   < > 202* 345*    < > = values in this interval not displayed.   ,   Results for orders placed or performed during the hospital encounter of 02/18/25   Chest XR,  PA & LAT    Narrative    EXAM: XR CHEST 2 VIEWS  LOCATION: [facility]  DATE: 2/18/2025    INDICATION: Dyspnea and chest pain.  COMPARISON: 1/16/2025.      Impression    IMPRESSION: No focal airspace consolidation. Minimal left basilar atelectasis. Mild pulmonary vascular redistribution. No pleural effusion or pneumothorax.    Mild cardiomegaly.    Healed fracture deformity of the midshaft left clavicle.   CT Chest w/o Contrast    Narrative    EXAM: CT CHEST W/O CONTRAST  LOCATION: [facility]  DATE: 2/19/2025    INDICATION: Cough with blood tinged sputum  COMPARISON: None.  TECHNIQUE: CT chest without IV contrast. Multiplanar reformats were obtained. Dose reduction techniques were used.  CONTRAST: None.    FINDINGS:   LUNGS AND PLEURA: The central airways are patent. Upper lung predominant tree-in-bud opacities. No areas of consolidation. Biapical scarring. No pleural effusion.    MEDIASTINUM/AXILLAE: Normal heart size. No pericardial effusion. No thoracic lymphadenopathy.    CORONARY ARTERY CALCIFICATION: Mild.    UPPER ABDOMEN: No acute findings.    MUSCULOSKELETAL: Degenerative changes of the spine. No worrisome bone lesions. Healed left clavicle fracture.      Impression    IMPRESSION:   Upper lung predominant tree-in-bud opacities, nonspecific for infectious/inflammatory bronchiolitis. No lobar consolidation.     NM Lexiscan stress test     Value    Target     Left Ventricular EF 71     Narrative      1.Negative pharmacological regadenoson ECG for ischemia, although   patient did have periods of 2:1 second-degree heart block.    2.The nuclear stress test is negative for inducible myocardial ischemia   or infarction.    3.The left ventricular ejection fraction at stress is 71%.    4.The findings of this examination were communicated to the nursing   staff at [facility] and Dr Caryn Polk.    There is no prior study for comparison.         Discharge Medications   Current Discharge Medication List        START taking these medications    Details   amoxicillin-clavulanate (AUGMENTIN) 500-125 MG tablet Take 1 tablet by mouth 2 times daily.  Qty: 10 tablet, Refills: 0    Associated Diagnoses: Chest pain, unspecified type; Second degree AV block, Mobitz type I; Mobitz type 1 second degree AV block; Type 2 diabetes mellitus with ketoacidosis and coma, with long-term current use of insulin (H)      azithromycin (ZITHROMAX) 250 MG tablet Take 1 tablet (250 mg) by mouth daily for 4 days.  Qty: 4 tablet, Refills: 0    Associated Diagnoses: Chest pain, unspecified type; Second degree AV block, Mobitz type I; Mobitz type 1 second degree AV block; Type 2 diabetes mellitus with ketoacidosis and coma, with long-term current use of insulin (H)      guaiFENesin (MUCINEX) 600 MG 12 hr tablet Take 1 tablet (600 mg) by mouth 2 times daily.  Qty: 10 tablet, Refills: 0    Associated Diagnoses: Chest pain, unspecified type; Second degree AV block, Mobitz type I; Mobitz type 1 second degree AV block; Type 2 diabetes mellitus with ketoacidosis and coma, with long-term current use of insulin (H)           CONTINUE these medications which have CHANGED    Details   amLODIPine (NORVASC) 5 MG tablet Take 1 tablet (5 mg) by mouth daily.  Qty: 30 tablet, Refills: 0    Associated Diagnoses: Benign essential hypertension           CONTINUE these medications which have NOT CHANGED    Details   acetaminophen (TYLENOL) 650 MG  CR tablet Take 650 mg by mouth every 8 hours as needed for pain      aspirin 81 MG EC tablet Take 81 mg by mouth daily       blood glucose monitoring (NO BRAND SPECIFIED) meter device kit Use to test blood sugar once daily      fluticasone (FLONASE) 50 MCG/ACT nasal spray Spray 1 spray into both nostrils daily      gabapentin (NEURONTIN) 100 MG capsule Take 100 mg by mouth 3 times daily      !! insulin glargine (LANTUS PEN) 100 UNIT/ML pen Inject 10 Units subcutaneously at bedtime.    Comments: If Lantus is not covered by insurance, may substitute Basaglar or Semglee or other insulin glargine product per insurance preference at same dose and frequency.    Associated Diagnoses: Hyperglycemia      !! insulin glargine (LANTUS PEN) 100 UNIT/ML pen Inject 34 Units subcutaneously every morning.      metFORMIN (GLUCOPHAGE-XR) 750 MG 24 hr tablet Take 750 mg by mouth 2 times daily (with meals)       Multiple Vitamins-Minerals (MULTIVITAMIN ADULTS PO) Take 1 tablet by mouth daily      Omega-3 Fatty Acids (FISH OIL) 1200 MG capsule Take 1,200 mg by mouth daily.      polyethylene glycol (MIRALAX) 17 GM/Dose powder Take 1 capful by mouth daily as needed for constipation       pravastatin (PRAVACHOL) 20 MG tablet Take 20 mg by mouth daily       sitagliptin (JANUVIA) 50 MG tablet Take 50 mg by mouth daily.      sodium chloride (DEEP SEA NASAL SPRAY) 0.65 % nasal spray Spray 2 sprays in nostril 4 times daily.       !! - Potential duplicate medications found. Please discuss with provider.        Allergies   Allergies   Allergen Reactions    Dulaglutide      Other Reaction(s): weight loss, malaise    Losartan      Other Reaction(s): paresthesias

## 2025-02-19 NOTE — ED PROVIDER NOTES
Emergency Department Encounter      NAME: Dee Sheikh  AGE: 85 year old male  YOB: 1940  MRN: [id number]  EVALUATION DATE & TIME: No admission date for patient encounter.    PCP: Cathy Caballero    ED PROVIDER: Ramon Abrams M.D.      Chief Complaint   Patient presents with    Chest Pain    Cough         FINAL IMPRESSION:  1. Chest pain, unspecified type    2. Second degree AV block, Mobitz type I        MEDICAL DECISION MAKIN:54 PM I met with the patient, obtained history, performed an initial exam, and discussed options and plan for diagnostics and treatment here in the ED.   10:40 PM I spoke with the hospitalist, Dr. Padilla, they accepted the patient for admission.      This patient is a 85-year-old  male with a history of type 2 diabetes, hypertension, hyperlipidemia and Mobitz type I secondary AV block who presents with chest pain.  He says that he started having sharp left-sided chest pain around 530 tonight.  He has a cough which is productive with white phlegm.  He has noted that his heart rates been slower than normal but has not had any syncope or lightheadedness.  He has had some diaphoresis with this episode as well.  The patient was slightly hypertensive in triage with her blood pressure 201/79.  EKG was done which showed sinus rhythm with a secondary AV block Mobitz type I.  Heart rate of 58 bpm.  The chest x-ray did not show any evidence of pneumonia.  I independently reviewed and interpreted the chest x-ray.  The initial troponin on the patient was 35 and the 11 PM troponin is pending.  I have admitted the patient to the hospitalist group for the chest pain and the white abnormal heart rhythm.  The hospitalist requested that we contact cardiology.  I have discussed the patient's case with Dr. Dupont from cardiology and he is aware that the hospitalist wants him consult on the patient.      Pertinent Labs & Imaging studies reviewed. (See chart for details)      Medical  Decision Making     History:  Supplemental history from: Documented in chart, if applicable  External Record(s) reviewed: Documented in chart, if applicable.     Work Up:  Chart documentation includes differential considered and any EKGs or imaging independently interpreted by provider, where specified.  In additional to work up documented, I considered the following work up: Documented in chart, if applicable.     External consultation:  Discussion of management with another provider: Documented in chart, if applicable     Complicating factors:  Care impacted by chronic illness: type 2 diabetes, hypertension, hyperlipidemia, Mobitz type 1 second degree AV block, and hyperglycemia  Care affected by social determinants of health: Access to Medical Care     Disposition considerations: Admit      MEDICATIONS GIVEN IN THE EMERGENCY:  Medications - No data to display    NEW PRESCRIPTIONS STARTED AT TODAY'S ER VISIT:  New Prescriptions    No medications on file          =================================================================    HPI    Patient information was obtained from: Patient and family member    Use of :  Yes (Phone) - Language Marcela Sheikh is a 85 year old male with a past medical history of type 2 diabetes, hypertension, hyperlipidemia, Mobitz type 1 second degree AV block, and hyperglycemia, who presents chest pain and cough.     Around 5:00 PM today the patient was coughing when he developed a sharp pain in his left chest. The pain has been constant since then whether he is coughing or not. Also noticed his heart feels like it is beating slower than normal. He has been coughing up white colored phlegm. Denies any fevers or abdominal pain. Not feeling syncopal. Endorses feeling sweaty with the pain and coughing.       REVIEW OF SYSTEMS   Review of Systems Per HPI    PAST MEDICAL HISTORY:  No past medical history on file.    PAST SURGICAL HISTORY:  Past Surgical History:   Procedure  Laterality Date    NO PAST SURGERIES         CURRENT MEDICATIONS:    No current facility-administered medications for this encounter.    Current Outpatient Medications:     acetaminophen (TYLENOL) 650 MG CR tablet, Take 650 mg by mouth every 8 hours as needed for pain, Disp: , Rfl:     amLODIPine (NORVASC) 2.5 MG tablet, Take 2.5 mg by mouth daily., Disp: , Rfl:     aspirin 81 MG EC tablet, Take 81 mg by mouth daily , Disp: , Rfl:     blood glucose monitoring (NO BRAND SPECIFIED) meter device kit, Use to test blood sugar once daily, Disp: , Rfl:     fluticasone (FLONASE) 50 MCG/ACT nasal spray, Spray 1 spray into both nostrils daily, Disp: , Rfl:     gabapentin (NEURONTIN) 100 MG capsule, Take 100 mg by mouth 3 times daily, Disp: , Rfl:     insulin glargine (LANTUS PEN) 100 UNIT/ML pen, Inject 10 Units subcutaneously at bedtime., Disp: , Rfl:     insulin glargine (LANTUS PEN) 100 UNIT/ML pen, Inject 34 Units subcutaneously every morning., Disp: , Rfl:     metFORMIN (GLUCOPHAGE-XR) 750 MG 24 hr tablet, Take 750 mg by mouth 2 times daily (with meals) , Disp: , Rfl:     Multiple Vitamins-Minerals (MULTIVITAMIN ADULTS PO), Take 1 tablet by mouth daily, Disp: , Rfl:     Omega-3 Fatty Acids (FISH OIL) 1200 MG capsule, Take 1,200 mg by mouth daily., Disp: , Rfl:     polyethylene glycol (MIRALAX) 17 GM/Dose powder, Take 1 capful by mouth daily as needed for constipation , Disp: , Rfl:     pravastatin (PRAVACHOL) 20 MG tablet, Take 20 mg by mouth daily , Disp: , Rfl:     sitagliptin (JANUVIA) 50 MG tablet, Take 50 mg by mouth daily., Disp: , Rfl:     sodium chloride (DEEP SEA NASAL SPRAY) 0.65 % nasal spray, Spray 2 sprays in nostril 4 times daily., Disp: , Rfl:     ALLERGIES:  Allergies   Allergen Reactions    Dulaglutide      Other Reaction(s): weight loss, malaise    Losartan      Other Reaction(s): paresthesias       FAMILY HISTORY:  No family history on file.    SOCIAL HISTORY:   Social History     Socioeconomic History     Marital status:    Tobacco Use    Smoking status: Never    Smokeless tobacco: Never   Vaping Use    Vaping status: Never Used   Substance and Sexual Activity    Alcohol use: No       PHYSICAL EXAM:    Vitals: BP (!) 201/79   Pulse 76   Temp 98.6  F (37  C) (Oral)   Resp 16   Wt 63.3 kg (139 lb 9.6 oz)   SpO2 98%   BMI 23.23 kg/m     Constitutional: Well developed, well nourished. Comfortable appearing.  HEAD:Normocephalic, atraumatic,   Eyes: PERRLA, EOM intact, conjunctiva clear, no discharge  ENT: mucous membranes moist, nose normal.   Neck- Supple, gross ROM intact.  No JVD.  No palpable nodes.  Pulmonary: Clear to auscultation bilaterally, no respiratory distress, no wheezing, speaks full sentences easily.  Chest: No chest wall tenderness  Cardiovascular: Regular rate and rhythm with frequent ectopy, no murmurs. No lower extremity edema, 2+ DP pulses. No reproducible pain with palpation.   GI: Soft, no tenderness to deep palpation in all quadrants, not distended, no masses.  No hepatosplenomegaly.  Musculoskeletal: Moving all 4 extremities intentionally and without pain. No obvious deformity.  Back: No CVA tenderness  Skin: Warm, dry, no rash.  Neurologic: Alert & oriented x 3, speech clear, moving all extremities spontaneously   Psychiatric: Affect normal, cooperative.     LAB:  All pertinent labs reviewed and interpreted.  Labs Ordered and Resulted from Time of ED Arrival to Time of ED Departure   BASIC METABOLIC PANEL - Abnormal       Result Value    Sodium 139      Potassium 5.1      Chloride 103      Carbon Dioxide (CO2) 25      Anion Gap 11      Urea Nitrogen 30.7 (*)     Creatinine 1.83 (*)     GFR Estimate 36 (*)     Calcium 9.8      Glucose 202 (*)    TROPONIN T, HIGH SENSITIVITY - Abnormal    Troponin T, High Sensitivity 35 (*)    CBC WITH PLATELETS AND DIFFERENTIAL - Abnormal    WBC Count 7.2      RBC Count 4.35 (*)     Hemoglobin 12.0 (*)     Hematocrit 35.7 (*)     MCV 82      MCH  27.6      MCHC 33.6      RDW 13.7      Platelet Count 158      % Neutrophils 58      % Lymphocytes 26      % Monocytes 9      % Eosinophils 5      % Basophils 0      % Immature Granulocytes 1      NRBCs per 100 WBC 0      Absolute Neutrophils 4.2      Absolute Lymphocytes 1.9      Absolute Monocytes 0.7      Absolute Eosinophils 0.4      Absolute Basophils 0.0      Absolute Immature Granulocytes 0.1      Absolute NRBCs 0.0     MAGNESIUM - Normal    Magnesium 1.7     NT PROBNP INPATIENT - Normal    N terminal Pro BNP Inpatient 162     TROPONIN T, HIGH SENSITIVITY   INFLUENZA A/B, RSV AND SARS-COV2 PCR       RADIOLOGY:  Chest XR,  PA & LAT   Final Result   IMPRESSION: No focal airspace consolidation. Minimal left basilar atelectasis. Mild pulmonary vascular redistribution. No pleural effusion or pneumothorax.      Mild cardiomegaly.      Healed fracture deformity of the midshaft left clavicle.          EKG:   Performed at: 19:44  Impression: Sinus rhythm with 2nd degree AV block (Mobitz I)  Rate: 58 bpm  Rhythm: sinus  QRS Interval: 84  QTc Interval: 398  Comparison: When compared with ECG from 1/16/2025 at 17:53, sinus rhythm is now with 2nd degree AV block (Mobitz I), sinus rhythm is no longer with 2nd degree AV block (Mobitz II).     I have independently reviewed and interpreted the EKG(s) documented above.     PROCEDURES:   Procedures       I, Candelaria Larry, am serving as a scribe to document services personally performed by Dr. Ramon Abrams based on my observation and the provider's statements to me. I, Ramon Abrams M.D. attest that Candelaria Larry is acting in a scribe capacity, has observed my performance of the services and has documented them in accordance with my direction.      Ramon Abrams M.D.  Emergency Medicine  [facility] EMERGENCY DEPARTMENT  [address] [facility] [address]  [phone]  Dept: [phone]       Ramon Abrams  MD  02/18/25 0277

## 2025-02-19 NOTE — PLAN OF CARE
Problem: Adult Inpatient Plan of Care  Goal: Absence of Hospital-Acquired Illness or Injury  Intervention: Identify and Manage Fall Risk  Recent Flowsheet Documentation  Taken 2/19/2025 0022 by Guillermo Garcia RN  Safety Promotion/Fall Prevention:   activity supervised   nonskid shoes/slippers when out of bed   lighting adjusted   clutter free environment maintained   room door open   safety round/check completed   patient and family education   room near nurse's station   room organization consistent  Intervention: Prevent Skin Injury  Recent Flowsheet Documentation  Taken 2/19/2025 0022 by Guillermo Garcia RN  Body Position: position changed independently  Intervention: Prevent Infection  Recent Flowsheet Documentation  Taken 2/19/2025 0022 by Guillermo Garcia RN  Infection Prevention:   hand hygiene promoted   rest/sleep promoted   Goal Outcome Evaluation:       Patient is alert and oriented; Hmong  used to communicate; c/o back pain 7/10 PRN Tylenol was effective.   Patient on tele second degree AV block type 1 with occasional PVC at the time ;  no insulin given .  Up ambulated with cane to the bathroom with SBA X 2.  Make need known. Care plan ongoing.    Guillermo HO RN.

## 2025-02-19 NOTE — MEDICATION SCRIBE - ADMISSION MEDICATION HISTORY
Medication Scribe Admission Medication History    Admission medication history is complete. The information provided in this note is only as accurate as the sources available at the time of the update.    Information Source(s): Family member via in-person    Pertinent Information: PTA med list updated per patient's son.    Changes made to PTA medication list:  Added: None  Deleted: None  Changed: None    Allergies reviewed with patient and updates made in EHR: yes    Medication History Completed By: Dennis Christopher 2/18/2025 11:01 PM    PTA Med List   Medication Sig Last Dose/Taking    acetaminophen (TYLENOL) 650 MG CR tablet Take 650 mg by mouth every 8 hours as needed for pain Taking As Needed    amLODIPine (NORVASC) 2.5 MG tablet Take 2.5 mg by mouth daily. 2/18/2025 Morning    aspirin 81 MG EC tablet Take 81 mg by mouth daily  2/18/2025 Morning    blood glucose monitoring (NO BRAND SPECIFIED) meter device kit Use to test blood sugar once daily Taking    fluticasone (FLONASE) 50 MCG/ACT nasal spray Spray 1 spray into both nostrils daily 2/18/2025 Morning    gabapentin (NEURONTIN) 100 MG capsule Take 100 mg by mouth 3 times daily 2/18/2025 Evening    insulin glargine (LANTUS PEN) 100 UNIT/ML pen Inject 10 Units subcutaneously at bedtime. 2/18/2025 Evening    insulin glargine (LANTUS PEN) 100 UNIT/ML pen Inject 34 Units subcutaneously every morning. 2/18/2025 Morning    metFORMIN (GLUCOPHAGE-XR) 750 MG 24 hr tablet Take 750 mg by mouth 2 times daily (with meals)  2/18/2025 Evening    Multiple Vitamins-Minerals (MULTIVITAMIN ADULTS PO) Take 1 tablet by mouth daily 2/18/2025 Morning    Omega-3 Fatty Acids (FISH OIL) 1200 MG capsule Take 1,200 mg by mouth daily. 2/18/2025 Morning    polyethylene glycol (MIRALAX) 17 GM/Dose powder Take 1 capful by mouth daily as needed for constipation  Taking As Needed    pravastatin (PRAVACHOL) 20 MG tablet Take 20 mg by mouth daily  2/18/2025 Morning    sitagliptin (JANUVIA) 50  MG tablet Take 50 mg by mouth daily. 2/18/2025 Morning    sodium chloride (DEEP SEA NASAL SPRAY) 0.65 % nasal spray Spray 2 sprays in nostril 4 times daily. 2/18/2025 Evening

## 2025-02-19 NOTE — ED TRIAGE NOTES
Pt arrives to triage with complaint of intermittent chest pains and a cough. Pain began around 1700 today. High BP at home 200s/100s. Denies any difficulty breathing.      Triage Assessment (Adult)       Row Name 02/18/25 1942          Triage Assessment    Airway WDL WDL        Respiratory WDL    Respiratory WDL WDL        Skin Circulation/Temperature WDL    Skin Circulation/Temperature WDL WDL        Cardiac WDL    Cardiac WDL X;chest pain        Chest Pain Assessment    Chest Pain Location anterior chest, left     Character sharp        Peripheral/Neurovascular WDL    Peripheral Neurovascular WDL WDL        Cognitive/Neuro/Behavioral WDL    Cognitive/Neuro/Behavioral WDL WDL

## 2025-02-20 LAB
ATRIAL RATE - MUSE: 87 BPM
DIASTOLIC BLOOD PRESSURE - MUSE: NORMAL MMHG
INTERPRETATION ECG - MUSE: NORMAL
P AXIS - MUSE: 67 DEGREES
PR INTERVAL - MUSE: NORMAL MS
QRS DURATION - MUSE: 84 MS
QT - MUSE: 406 MS
QTC - MUSE: 398 MS
R AXIS - MUSE: 30 DEGREES
SYSTOLIC BLOOD PRESSURE - MUSE: NORMAL MMHG
T AXIS - MUSE: 60 DEGREES
VENTRICULAR RATE- MUSE: 58 BPM

## 2025-02-25 ENCOUNTER — OFFICE VISIT (OUTPATIENT)
Dept: CARDIOLOGY | Facility: CLINIC | Age: 85
End: 2025-02-25
Attending: INTERNAL MEDICINE
Payer: COMMERCIAL

## 2025-02-25 VITALS
HEART RATE: 72 BPM | BODY MASS INDEX: 23.16 KG/M2 | SYSTOLIC BLOOD PRESSURE: 152 MMHG | WEIGHT: 139 LBS | RESPIRATION RATE: 20 BRPM | DIASTOLIC BLOOD PRESSURE: 66 MMHG | HEIGHT: 65 IN

## 2025-02-25 DIAGNOSIS — I44.1 MOBITZ TYPE 1 SECOND DEGREE AV BLOCK: ICD-10-CM

## 2025-02-25 DIAGNOSIS — R00.1 BRADYCARDIA: ICD-10-CM

## 2025-02-25 NOTE — PROGRESS NOTES
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    Mr. Sheikh was noted to have type I 2nd degree AV block during admission - for hypoglycemia.  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         Physical Examination  Review of Systems   VITALS: BP (!) 178/74 (BP Location: Right arm, Patient Position: Sitting, Cuff Size: Adult Regular)   Pulse 72   Resp 20   Ht 1.651 m (5' 5\")   Wt 63 kg (139 lb)   BMI 23.13 kg/m    Wt Readings from Last 3 Encounters:   25 63.3 kg (139 lb 9.6 oz)   25 59.9 kg (132 lb)   25 59 kg (130 lb)     CONSTITUTIONAL: well nourished, comfortable, no distress  EYES:  Conjunctivae pink, sclerae clear.    E/N/T:  Oral mucosa pink  RESPIRATORY:  Respiratory effort is normal  CARDIOVASCULAR:  normal S1 and S2  GASTROINTESTINAL:  Abdomen without masses or tenderness  EXTREMITIES:  No clubbing or cyanosis.    MUSCULOSKELETAL:  Overall grossly normal muscle strength  SKIN:  Overall, skin warm and dry, no lesions.  NEURO/PSYCH:  Oriented x 3 with normal affect.   Constitutional:  No weight loss or loss of appetite    Eyes:  No difficulty with vision, no double vision, no dry eyes  ENT:  No sore throat, difficulty swallowing; changes in " hearing or tinnitus  Cardiovascular: As detailed above  Respiratory:  No cough  Musculoskeletal  No joint pain, muscle aches  Neurologic:  No syncope, lightheadedness, fainting spells   Hematologic: No easy bruising, excessive bleeding tendency   Gastrointestinal:  No jaundice, abdominal pain or abdominal bloating  Genitourinary: No changes in urinary habits, no trouble urinating    Psychiatric: No anxiety or depression      Medical History  Surgical History   No past medical history on file. Past Surgical History:   Procedure Laterality Date    NO PAST SURGERIES           Family History Social History   No family history on file.     Social History     Tobacco Use    Smoking status: Never    Smokeless tobacco: Never   Vaping Use    Vaping status: Never Used   Substance Use Topics    Alcohol use: No         Medications  Allergies     Current Outpatient Medications:     acetaminophen (TYLENOL) 650 MG CR tablet, Take 650 mg by mouth every 8 hours as needed for pain, Disp: , Rfl:     amLODIPine (NORVASC) 5 MG tablet, Take 1 tablet (5 mg) by mouth daily., Disp: 30 tablet, Rfl: 0    amoxicillin-clavulanate (AUGMENTIN) 500-125 MG tablet, Take 1 tablet by mouth 2 times daily., Disp: 10 tablet, Rfl: 0    aspirin 81 MG EC tablet, Take 81 mg by mouth daily , Disp: , Rfl:     blood glucose monitoring (NO BRAND SPECIFIED) meter device kit, Use to test blood sugar once daily, Disp: , Rfl:     fluticasone (FLONASE) 50 MCG/ACT nasal spray, Spray 1 spray into both nostrils daily, Disp: , Rfl:     gabapentin (NEURONTIN) 100 MG capsule, Take 100 mg by mouth 3 times daily, Disp: , Rfl:     guaiFENesin (MUCINEX) 600 MG 12 hr tablet, Take 1 tablet (600 mg) by mouth 2 times daily., Disp: 10 tablet, Rfl: 0    insulin glargine (LANTUS PEN) 100 UNIT/ML pen, Inject 10 Units subcutaneously at bedtime., Disp: , Rfl:     insulin glargine (LANTUS PEN) 100 UNIT/ML pen, Inject 34 Units subcutaneously every morning., Disp: , Rfl:     metFORMIN  "(GLUCOPHAGE-XR) 750 MG 24 hr tablet, Take 750 mg by mouth 2 times daily (with meals) , Disp: , Rfl:     Multiple Vitamins-Minerals (MULTIVITAMIN ADULTS PO), Take 1 tablet by mouth daily, Disp: , Rfl:     Omega-3 Fatty Acids (FISH OIL) 1200 MG capsule, Take 1,200 mg by mouth daily., Disp: , Rfl:     polyethylene glycol (MIRALAX) 17 GM/Dose powder, Take 1 capful by mouth daily as needed for constipation , Disp: , Rfl:     pravastatin (PRAVACHOL) 20 MG tablet, Take 20 mg by mouth daily , Disp: , Rfl:     sitagliptin (JANUVIA) 50 MG tablet, Take 50 mg by mouth daily., Disp: , Rfl:     sodium chloride (DEEP SEA NASAL SPRAY) 0.65 % nasal spray, Spray 2 sprays in nostril 4 times daily., Disp: , Rfl:      Allergies   Allergen Reactions    Dulaglutide      Other Reaction(s): weight loss, malaise    Losartan      Other Reaction(s): paresthesias          Lab Results    Chemistry CBC Cardiac Enzymes/BNP/TSH/INR   Recent Labs   Lab Test 02/19/25  1321 02/19/25  0849   NA  --  139   POTASSIUM  --  4.6   CHLORIDE  --  104   CO2  --  26   * 140*   BUN  --  30.6*   CR  --  1.66*   GFRESTIMATED  --  40*   CHIQUITA  --  9.8     Recent Labs   Lab Test 02/19/25  0849 02/18/25 2055 01/22/25  1158   CR 1.66* 1.83* 1.74*          Recent Labs   Lab Test 02/19/25  0849   WBC 8.2   HGB 11.4*   HCT 33.7*   MCV 81   *     Recent Labs   Lab Test 02/19/25  0849 02/18/25 2055 01/17/25  0343   HGB 11.4* 12.0* 13.7    No results for input(s): \"TROPONINI\" in the last 90313 hours.  Recent Labs   Lab Test 02/18/25 2055   NTBNPI 162     Recent Labs   Lab Test 01/16/25  1402   TSH 0.69     No results for input(s): \"INR\" in the last 42905 hours.      Data Review    ECGs (tracings independently reviewed)  2/18/2025 - SR 58bpm, type I 2nd degree AV block, QRS 84ms  1/16/2025 - SR 61bpm, first degree AV block    Zio monitoring from 1/29/2025 to 2/4/2025 (duration 5d 21h) (independently reviewed)  Predominant underlying rhythm was sinus rhythm, 67 " to 109bpm, average 77bpm.  No nonsustained or sustained tachyarrhythmias.  No atrial fibrillation.  There were no pauses of greater than 3 seconds.  First-degree AV delay was noted, type I second-degree AV block was noted at 4:15 PM on 1/30/2025 and 12:47 AM on 2/1/2025  No supraventricular ectopic beats (0%).  Rare premature ventricular contractions (1.6%).  Symptom triggers - none.    2/19/2025 MPI    1.Negative pharmacological regadenoson ECG for ischemia, although patient did have periods of 2:1 second-degree heart block.    2.The nuclear stress test is negative for inducible myocardial ischemia or infarction.    3.The left ventricular ejection fraction at stress is 71%.    4.The findings of this examination were communicated to the nursing staff at [facility] and Dr Caryn Polk.    There is no prior study for comparison.       Cc: Caryn Polk MD, Cathy Caballero MD Amila Dilusha William, MD  2/25/2025  4:06 PM

## 2025-02-25 NOTE — LETTER
"2025    Cathy Caballero MD  [address] [place] [address]    RE: Dee Sheikh       Dear Colleague,     I had the pleasure of seeing Dee Sheikh in the [facility].     [facility]  Cardiac Electrophysiology  [address]  [place] [address]   Office: [phone]  Fax: [phone]     Patient: Dee Sheikh   : 1940       CHIEF COMPLAINT/REASON FOR VISIT  Type I 2nd degree AV block      Assessment/Recommendations     Type I 2nd degree AV block - likely asymptomatic, doubt contribution to intermittent chest pain.  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    Mr. Sheikh was noted to have type I 2nd degree AV block during admission - for hypoglycemia.  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         Physical Examination  Review of Systems   VITALS: BP (!) 178/74 (BP Location: Right arm, Patient Position: Sitting, Cuff Size: Adult Regular)   Pulse 72   Resp 20   Ht 1.651 m (5' 5\")   Wt 63 kg (139 lb)   BMI 23.13 kg/m    Wt Readings from Last 3 Encounters:   25 63.3 kg (139 lb 9.6 oz)   25 59.9 kg (132 lb)   25 59 kg (130 lb)     CONSTITUTIONAL: well nourished, comfortable, no distress  EYES:  Conjunctivae pink, sclerae clear.    E/N/T:  Oral mucosa pink  RESPIRATORY:  Respiratory effort is normal  CARDIOVASCULAR:  normal S1 and S2  GASTROINTESTINAL:  Abdomen without masses or tenderness  EXTREMITIES:  No clubbing or cyanosis.    MUSCULOSKELETAL:  Overall grossly normal muscle strength  SKIN:  Overall, skin warm and dry, no lesions.  NEURO/PSYCH:  Oriented x 3 with normal " affect.   Constitutional:  No weight loss or loss of appetite    Eyes:  No difficulty with vision, no double vision, no dry eyes  ENT:  No sore throat, difficulty swallowing; changes in hearing or tinnitus  Cardiovascular: As detailed above  Respiratory:  No cough  Musculoskeletal  No joint pain, muscle aches  Neurologic:  No syncope, lightheadedness, fainting spells   Hematologic: No easy bruising, excessive bleeding tendency   Gastrointestinal:  No jaundice, abdominal pain or abdominal bloating  Genitourinary: No changes in urinary habits, no trouble urinating    Psychiatric: No anxiety or depression      Medical History  Surgical History   No past medical history on file. Past Surgical History:   Procedure Laterality Date     NO PAST SURGERIES           Family History Social History   No family history on file.     Social History     Tobacco Use     Smoking status: Never     Smokeless tobacco: Never   Vaping Use     Vaping status: Never Used   Substance Use Topics     Alcohol use: No         Medications  Allergies     Current Outpatient Medications:      acetaminophen (TYLENOL) 650 MG CR tablet, Take 650 mg by mouth every 8 hours as needed for pain, Disp: , Rfl:      amLODIPine (NORVASC) 5 MG tablet, Take 1 tablet (5 mg) by mouth daily., Disp: 30 tablet, Rfl: 0     amoxicillin-clavulanate (AUGMENTIN) 500-125 MG tablet, Take 1 tablet by mouth 2 times daily., Disp: 10 tablet, Rfl: 0     aspirin 81 MG EC tablet, Take 81 mg by mouth daily , Disp: , Rfl:      blood glucose monitoring (NO BRAND SPECIFIED) meter device kit, Use to test blood sugar once daily, Disp: , Rfl:      fluticasone (FLONASE) 50 MCG/ACT nasal spray, Spray 1 spray into both nostrils daily, Disp: , Rfl:      gabapentin (NEURONTIN) 100 MG capsule, Take 100 mg by mouth 3 times daily, Disp: , Rfl:      guaiFENesin (MUCINEX) 600 MG 12 hr tablet, Take 1 tablet (600 mg) by mouth 2 times daily., Disp: 10 tablet, Rfl: 0     insulin glargine (LANTUS PEN) 100  "UNIT/ML pen, Inject 10 Units subcutaneously at bedtime., Disp: , Rfl:      insulin glargine (LANTUS PEN) 100 UNIT/ML pen, Inject 34 Units subcutaneously every morning., Disp: , Rfl:      metFORMIN (GLUCOPHAGE-XR) 750 MG 24 hr tablet, Take 750 mg by mouth 2 times daily (with meals) , Disp: , Rfl:      Multiple Vitamins-Minerals (MULTIVITAMIN ADULTS PO), Take 1 tablet by mouth daily, Disp: , Rfl:      Omega-3 Fatty Acids (FISH OIL) 1200 MG capsule, Take 1,200 mg by mouth daily., Disp: , Rfl:      polyethylene glycol (MIRALAX) 17 GM/Dose powder, Take 1 capful by mouth daily as needed for constipation , Disp: , Rfl:      pravastatin (PRAVACHOL) 20 MG tablet, Take 20 mg by mouth daily , Disp: , Rfl:      sitagliptin (JANUVIA) 50 MG tablet, Take 50 mg by mouth daily., Disp: , Rfl:      sodium chloride (DEEP SEA NASAL SPRAY) 0.65 % nasal spray, Spray 2 sprays in nostril 4 times daily., Disp: , Rfl:      Allergies   Allergen Reactions     Dulaglutide      Other Reaction(s): weight loss, malaise     Losartan      Other Reaction(s): paresthesias          Lab Results    Chemistry CBC Cardiac Enzymes/BNP/TSH/INR   Recent Labs   Lab Test 02/19/25  1321 02/19/25  0849   NA  --  139   POTASSIUM  --  4.6   CHLORIDE  --  104   CO2  --  26   * 140*   BUN  --  30.6*   CR  --  1.66*   GFRESTIMATED  --  40*   CHIQUITA  --  9.8     Recent Labs   Lab Test 02/19/25  0849 02/18/25 2055 01/22/25  1158   CR 1.66* 1.83* 1.74*          Recent Labs   Lab Test 02/19/25  0849   WBC 8.2   HGB 11.4*   HCT 33.7*   MCV 81   *     Recent Labs   Lab Test 02/19/25  0849 02/18/25 2055 01/17/25  0343   HGB 11.4* 12.0* 13.7    No results for input(s): \"TROPONINI\" in the last 42554 hours.  Recent Labs   Lab Test 02/18/25 2055   NTBNPI 162     Recent Labs   Lab Test 01/16/25  1402   TSH 0.69     No results for input(s): \"INR\" in the last 58883 hours.      Data Review    ECGs (tracings independently reviewed)  2/18/2025 - SR 58bpm, type I 2nd " degree AV block, QRS 84ms  1/16/2025 - SR 61bpm, first degree AV block    Zio monitoring from 1/29/2025 to 2/4/2025 (duration 5d 21h) (independently reviewed)  Predominant underlying rhythm was sinus rhythm, 67 to 109bpm, average 77bpm.  No nonsustained or sustained tachyarrhythmias.  No atrial fibrillation.  There were no pauses of greater than 3 seconds.  First-degree AV delay was noted, type I second-degree AV block was noted at 4:15 PM on 1/30/2025 and 12:47 AM on 2/1/2025  No supraventricular ectopic beats (0%).  Rare premature ventricular contractions (1.6%).  Symptom triggers - none.    2/19/2025 MPI     1.Negative pharmacological regadenoson ECG for ischemia, although patient did have periods of 2:1 second-degree heart block.     2.The nuclear stress test is negative for inducible myocardial ischemia or infarction.     3.The left ventricular ejection fraction at stress is 71%.     4.The findings of this examination were communicated to the nursing staff at [facility] and Dr Caryn Polk.     There is no prior study for comparison.       Cc: Caryn Polk MD, Cathy Caballero MD Amila Dilusha William, MD  2/25/2025  4:06 PM        Thank you for allowing me to participate in the care of your patient.      Sincerely,     Maryjane London MD     [facility]  cc:   Caryn Polk MD  [address] [facility]  [place] [address]

## 2025-03-03 ENCOUNTER — HOSPITAL ENCOUNTER (OUTPATIENT)
Dept: RADIOLOGY | Facility: HOSPITAL | Age: 85
Discharge: HOME | End: 2025-03-03
Attending: FAMILY MEDICINE
Payer: COMMERCIAL

## 2025-03-03 VITALS
OXYGEN SATURATION: 99 % | DIASTOLIC BLOOD PRESSURE: 71 MMHG | HEART RATE: 73 BPM | RESPIRATION RATE: 16 BRPM | TEMPERATURE: 98.3 F | SYSTOLIC BLOOD PRESSURE: 160 MMHG

## 2025-03-03 DIAGNOSIS — A53.9 SYPHILIS: ICD-10-CM

## 2025-03-03 DIAGNOSIS — A53.9 SYPHILIS: Primary | ICD-10-CM

## 2025-03-03 DIAGNOSIS — Z86.19 HISTORY OF SYPHILIS: ICD-10-CM

## 2025-03-03 LAB — HOLD SPECIMEN: YES

## 2025-03-03 PROCEDURE — 62328 DX LMBR SPI PNXR W/FLUOR/CT: CPT

## 2025-03-03 PROCEDURE — 86592 SYPHILIS TEST NON-TREP QUAL: CPT | Performed by: FAMILY MEDICINE

## 2025-03-05 LAB — VDRL CSF QL: NON REACTIVE

## 2025-03-21 ENCOUNTER — LAB REQUISITION (OUTPATIENT)
Dept: LAB | Facility: CLINIC | Age: 85
End: 2025-03-21

## 2025-03-21 DIAGNOSIS — E11.65 TYPE 2 DIABETES MELLITUS WITH HYPERGLYCEMIA (H): ICD-10-CM

## 2025-03-21 LAB
CHOLEST SERPL-MCNC: 129 MG/DL
FASTING STATUS PATIENT QL REPORTED: NO
HDLC SERPL-MCNC: 35 MG/DL
LDLC SERPL CALC-MCNC: 58 MG/DL
NONHDLC SERPL-MCNC: 94 MG/DL
TRIGL SERPL-MCNC: 182 MG/DL

## 2025-03-21 PROCEDURE — 80061 LIPID PANEL: CPT | Performed by: FAMILY MEDICINE

## 2025-06-04 ENCOUNTER — OFFICE VISIT (OUTPATIENT)
Dept: PHARMACY | Facility: PHYSICIAN GROUP | Age: 85
End: 2025-06-04
Payer: COMMERCIAL

## 2025-06-04 VITALS — WEIGHT: 136.5 LBS | BODY MASS INDEX: 22.71 KG/M2 | DIASTOLIC BLOOD PRESSURE: 78 MMHG | SYSTOLIC BLOOD PRESSURE: 132 MMHG

## 2025-06-04 DIAGNOSIS — Z79.4 TYPE 2 DIABETES MELLITUS TREATED WITH INSULIN (H): Primary | ICD-10-CM

## 2025-06-04 DIAGNOSIS — E78.5 HYPERLIPIDEMIA LDL GOAL <70: ICD-10-CM

## 2025-06-04 DIAGNOSIS — E11.9 TYPE 2 DIABETES MELLITUS TREATED WITH INSULIN (H): Primary | ICD-10-CM

## 2025-06-04 DIAGNOSIS — I10 BENIGN ESSENTIAL HTN: ICD-10-CM

## 2025-06-04 DIAGNOSIS — J30.2 SEASONAL ALLERGIES: ICD-10-CM

## 2025-06-04 DIAGNOSIS — R73.9 HYPERGLYCEMIA: ICD-10-CM

## 2025-06-04 PROCEDURE — 99606 MTMS BY PHARM EST 15 MIN: CPT | Performed by: PHARMACIST

## 2025-06-04 PROCEDURE — 99607 MTMS BY PHARM ADDL 15 MIN: CPT | Performed by: PHARMACIST

## 2025-06-04 PROCEDURE — 3075F SYST BP GE 130 - 139MM HG: CPT | Performed by: PHARMACIST

## 2025-06-04 PROCEDURE — 3078F DIAST BP <80 MM HG: CPT | Performed by: PHARMACIST

## 2025-06-04 NOTE — PROGRESS NOTES
Medication Therapy Management (MTM) Encounter    ASSESSMENT:                            Medication Adherence/Access: No issues identified.    Diabetes:  A1c not at goal <7%, postprandial  sugars are high per [place]. Not at Time in Range goal >50%.. Would benefit from using mealtime insulin as it should not be decreasing appetite. Offered option to try Huamlog instead, willing to restart Novolog at lower dose. Already failed SFU, on max dose Januvia for GFR, can't lose additional weight on GLP-1.      Hypertension:  Blood pressure very close to goal <130/80 mmHg today, continue current medications.    Hyperlipidemia  Stable on low intensity statin, last LDL at goal less than 70 mg/dL    Allergy  Would benefit from continuing therapy.     PLAN:                            Retry Novolog 14 units three times daily with meals   It is important to control blood sugar to help prevent kidney or heart damage. Lets try a lower dose of insulin to see if that helps with blood sugar without causing too low appetite.   Refilled Flonase  Check with hearing clinic about hearing aids   Changed to Freestyle Amina 2 plus sensors     Follow-up:   Dr Caballero 3 weeks    Amalia 7 weeks     SUBJECTIVE/OBJECTIVE:                          Dee Sheikh is a 85 year old male seen for a follow up visit. Patient was accompanied by his son. Patient [facility]     Reason for visit: DM follow up     Allergies/ADRs: Reviewed in chart  Past Medical History: Reviewed in chart  Tobacco: He reports that he has never smoked. He has never used smokeless tobacco.  Alcohol: not currently using      Medication Adherence/Access:   - has home health nurse set up twice daily pillbox every Thursday. Denies missed doses. Has gabapentin and cetirizine in third slot per day for as needed use     Diabetes   -Januvia 50 MG Tablet 1 tablet Orally once a day for diabetes AM  -Lantus SoloStar 100 UNIT/ML Solution Pen-injector 34 units in the morning  "and 16 units at bedtime   -Novolog 14 units before meals - see below  -metFORMIN HCl  MG Tablet Extended Release 24 Hour TAKE 1 TABLET ORALLY TWICE A DAY FOR DIABETES.  -Aspirin 81 MG Tablet Delayed Release 1 tablet orally once a day.    Stopped mealtime insulin because he doesn't want to eat when he takes it - \"if I use it, my appetite goes away\"    Tried using after meals, that did not help   Just not feeling hungry, not sure     Current diabetes symptoms: none  Diet/Exercise: Knows veggies and rice/eggs is lower for blood sugar than rice - eating whatever the kids make  Walks with cane   A1c 8.8% 3/21/25     Blood sugar monitoring:  Amina 2 using reader-    Continuous Glucose Monitoring Results:   Average last 30 days 284 mg/dL  Average Glucose Last 14 Days = 245 mg/dl  Last 7 days 222 mg/dL  Time in Target Last 14 Days:  Above 180 mg/dL: 71%  In target  mg/dL: 28%  Below 70 mg/dL: 1%- morning low this week, otherwise none   Eye exam is up to date     Hypertension /bradycardia/ Mobitz type 1 AV block  -amLODIPine Besylate 5 MG Tablet 1 tablet Orally Once a day.  Patient reports no current medication side effects  Patient self monitors blood pressure.  Home BP monitoring - didn't bring log.         Hyperlipidemia   -Pravastatin Sodium 20 MG Tablet TAKE 1 TABLET BY MOUTH ONCE DAILY 90.  -Fish Oil 1200 MG Capsule TAKE 1 CAPSULE ORALLY twice daily FOR CHOLESTEROL 90 DAYS.  Patient reports no significant myalgias or other side effects.         Allergy   -Fluticasone Propionate 50 MCG/ACT Suspension 2 sprays in each nostril Nasally Once a day for allergies/congestion.  -cetirizine 10 mg daily - in as needed section of pillbox   Patient reports no current medication side effects.    Primary symptoms are nasal congestion.   Patient feels that current therapy is effective. Would like a refill   Ears are hurting, not hearing as well. Not wearing hearing aids regularly        Today's Vitals: /78   Wt 136 " lb 8 oz (61.9 kg)   BMI 22.71 kg/m    ----------------    MED REC REQUIRED  Post Medication Reconciliation Status: discharge medications reconciled and changed, per note/orders    I spent 30 minutes with this patient today. All changes were made via verbal approval with Cathy Caballero MD.     A summary of these recommendations was given to the patient.    Amalia Sanabria, Pharm.D.  Medication Therapy Management Pharmacist           Medication Therapy Recommendations  Type 2 diabetes mellitus (H)   1 Current Medication: insulin aspart (NOVOLOG FLEXPEN) 100 UNIT/ML pen   Current Medication Sig: Inject 14 Units subcutaneously 2 times daily (with meals).   Rationale: Does not understand instructions - Adherence - Adherence   Recommendation: Provide Education   Status: Patient Agreed - Adherence/Education   Identified Date: 6/4/2025 Completed Date: 6/4/2025         2 Rationale: More effective medication available - Ineffective medication - Effectiveness   Recommendation: Change Medication - FreeStyle Amina 2 Plus Sensor Misc   Status: Accepted per CPA   Identified Date: 6/4/2025 Completed Date: 6/4/2025

## 2025-06-04 NOTE — PATIENT INSTRUCTIONS
"Recommendations from today's MTM visit:                                                      Retry Novolog 14 units three times daily with meals   It is important to control blood sugar to help prevent kidney or heart damage. Lets try a lower dose of insulin to see if that helps with blood sugar without causing too low appetite.     Follow-up:   Dr Caballero 3 weeks    Amalia 7 weeks     It was great speaking with you today.  I value your experience and would be very thankful for your time in providing feedback in our clinic survey. In the next few days, you may receive an email or text message from [employer] with a link to a survey related to your  clinical pharmacist.\"     To schedule another MTM appointment, please call the clinic directly or you may call [phone]    My Clinical Pharmacist's contact information:                                                      Please feel free to contact me with any questions or concerns you have. Use the patient portal - address to your provider with subject line \"to Amalia\" or use [employer] to send me a message.      Amaila Sanabria, Pharm.D.  Medication Therapy Management Pharmacist  [facility]  "

## 2025-06-23 ENCOUNTER — LAB REQUISITION (OUTPATIENT)
Dept: LAB | Facility: CLINIC | Age: 85
End: 2025-06-23

## 2025-06-23 DIAGNOSIS — E11.65 TYPE 2 DIABETES MELLITUS WITH HYPERGLYCEMIA (H): ICD-10-CM

## 2025-06-23 LAB
ALBUMIN SERPL BCG-MCNC: 3.8 G/DL (ref 3.5–5.2)
ALP SERPL-CCNC: 102 U/L (ref 40–150)
ALT SERPL W P-5'-P-CCNC: 36 U/L (ref 0–70)
ANION GAP SERPL CALCULATED.3IONS-SCNC: 11 MMOL/L (ref 7–15)
AST SERPL W P-5'-P-CCNC: 26 U/L (ref 0–45)
BILIRUB SERPL-MCNC: 0.6 MG/DL
BUN SERPL-MCNC: 44.8 MG/DL (ref 8–23)
CALCIUM SERPL-MCNC: 9.1 MG/DL (ref 8.8–10.4)
CHLORIDE SERPL-SCNC: 102 MMOL/L (ref 98–107)
CREAT SERPL-MCNC: 2.26 MG/DL (ref 0.67–1.17)
CREAT UR-MCNC: 99.2 MG/DL
EGFRCR SERPLBLD CKD-EPI 2021: 28 ML/MIN/1.73M2
GLUCOSE SERPL-MCNC: 349 MG/DL (ref 70–99)
HCO3 SERPL-SCNC: 21 MMOL/L (ref 22–29)
MICROALBUMIN UR-MCNC: 789 MG/L
MICROALBUMIN/CREAT UR: 795.36 MG/G CR (ref 0–17)
POTASSIUM SERPL-SCNC: 5.4 MMOL/L (ref 3.4–5.3)
PROT SERPL-MCNC: 7.1 G/DL (ref 6.4–8.3)
SODIUM SERPL-SCNC: 134 MMOL/L (ref 135–145)

## 2025-06-23 PROCEDURE — 82570 ASSAY OF URINE CREATININE: CPT | Performed by: FAMILY MEDICINE

## 2025-06-23 PROCEDURE — 84155 ASSAY OF PROTEIN SERUM: CPT | Performed by: FAMILY MEDICINE

## 2025-07-17 ENCOUNTER — VIRTUAL VISIT (OUTPATIENT)
Dept: INTERPRETER SERVICES | Facility: CLINIC | Age: 85
End: 2025-07-17

## 2025-07-17 ENCOUNTER — HOSPITAL ENCOUNTER (EMERGENCY)
Facility: HOSPITAL | Age: 85
Discharge: HOME | End: 2025-07-17
Attending: EMERGENCY MEDICINE
Payer: COMMERCIAL

## 2025-07-17 VITALS
OXYGEN SATURATION: 100 % | SYSTOLIC BLOOD PRESSURE: 162 MMHG | BODY MASS INDEX: 23.39 KG/M2 | DIASTOLIC BLOOD PRESSURE: 81 MMHG | RESPIRATION RATE: 28 BRPM | TEMPERATURE: 97.9 F | HEIGHT: 63 IN | HEART RATE: 82 BPM | WEIGHT: 132 LBS

## 2025-07-17 DIAGNOSIS — R53.1 WEAKNESS: ICD-10-CM

## 2025-07-17 DIAGNOSIS — E11.65 HYPERGLYCEMIA DUE TO DIABETES MELLITUS (H): ICD-10-CM

## 2025-07-17 LAB
ALBUMIN UR-MCNC: 100 MG/DL
ANION GAP SERPL CALCULATED.3IONS-SCNC: 10 MMOL/L (ref 7–15)
APPEARANCE UR: CLEAR
B-OH-BUTYR SERPL-SCNC: 0.38 MMOL/L
BASE EXCESS BLDV CALC-SCNC: -1.6 MMOL/L (ref -3–3)
BASOPHILS # BLD AUTO: 0 10E3/UL (ref 0–0.2)
BASOPHILS NFR BLD AUTO: 1 %
BILIRUB UR QL STRIP: NEGATIVE
BUN SERPL-MCNC: 39.3 MG/DL (ref 8–23)
CALCIUM SERPL-MCNC: 8.7 MG/DL (ref 8.8–10.4)
CHLORIDE SERPL-SCNC: 95 MMOL/L (ref 98–107)
COLOR UR AUTO: ABNORMAL
CREAT SERPL-MCNC: 2.13 MG/DL (ref 0.67–1.17)
EGFRCR SERPLBLD CKD-EPI 2021: 30 ML/MIN/1.73M2
EOSINOPHIL # BLD AUTO: 0.2 10E3/UL (ref 0–0.7)
EOSINOPHIL NFR BLD AUTO: 3 %
ERYTHROCYTE [DISTWIDTH] IN BLOOD BY AUTOMATED COUNT: 13.1 % (ref 10–15)
GLUCOSE BLDC GLUCOMTR-MCNC: 165 MG/DL (ref 70–99)
GLUCOSE BLDC GLUCOMTR-MCNC: 377 MG/DL (ref 70–99)
GLUCOSE BLDC GLUCOMTR-MCNC: 553 MG/DL (ref 70–99)
GLUCOSE BLDC GLUCOMTR-MCNC: >600 MG/DL (ref 70–99)
GLUCOSE SERPL-MCNC: 669 MG/DL (ref 70–99)
GLUCOSE UR STRIP-MCNC: >1000 MG/DL
HCO3 BLDV-SCNC: 25 MMOL/L (ref 21–28)
HCO3 SERPL-SCNC: 24 MMOL/L (ref 22–29)
HCT VFR BLD AUTO: 36.5 % (ref 40–53)
HGB BLD-MCNC: 12.7 G/DL (ref 13.3–17.7)
HGB UR QL STRIP: ABNORMAL
HOLD SPECIMEN: NORMAL
HOLD SPECIMEN: NORMAL
IMM GRANULOCYTES # BLD: 0 10E3/UL
IMM GRANULOCYTES NFR BLD: 1 %
KETONES UR STRIP-MCNC: NEGATIVE MG/DL
LEUKOCYTE ESTERASE UR QL STRIP: NEGATIVE
LYMPHOCYTES # BLD AUTO: 1.8 10E3/UL (ref 0.8–5.3)
LYMPHOCYTES NFR BLD AUTO: 28 %
MCH RBC QN AUTO: 26.9 PG (ref 26.5–33)
MCHC RBC AUTO-ENTMCNC: 34.8 G/DL (ref 31.5–36.5)
MCV RBC AUTO: 77 FL (ref 78–100)
MONOCYTES # BLD AUTO: 0.5 10E3/UL (ref 0–1.3)
MONOCYTES NFR BLD AUTO: 8 %
NEUTROPHILS # BLD AUTO: 4 10E3/UL (ref 1.6–8.3)
NEUTROPHILS NFR BLD AUTO: 61 %
NITRATE UR QL: NEGATIVE
NRBC # BLD AUTO: 0 10E3/UL
NRBC BLD AUTO-RTO: 0 /100
O2/TOTAL GAS SETTING VFR VENT: 21 %
OXYHGB MFR BLDV: 48 % (ref 70–75)
PCO2 BLDV: 48 MM HG (ref 40–50)
PH BLDV: 7.32 [PH] (ref 7.32–7.43)
PH UR STRIP: 6 [PH] (ref 5–7)
PLATELET # BLD AUTO: 145 10E3/UL (ref 150–450)
PO2 BLDV: 28 MM HG (ref 25–47)
POTASSIUM SERPL-SCNC: 5.6 MMOL/L (ref 3.4–5.3)
RBC # BLD AUTO: 4.72 10E6/UL (ref 4.4–5.9)
RBC URINE: 1 /HPF
SAO2 % BLDV: 48.7 % (ref 70–75)
SODIUM SERPL-SCNC: 129 MMOL/L (ref 135–145)
SP GR UR STRIP: 1.03 (ref 1–1.03)
SQUAMOUS EPITHELIAL: <1 /HPF
TROPONIN T SERPL HS-MCNC: 29 NG/L
TROPONIN T SERPL HS-MCNC: 34 NG/L
TROPONIN T SERPL HS-MCNC: 35 NG/L
UROBILINOGEN UR STRIP-MCNC: NORMAL MG/DL
WBC # BLD AUTO: 6.5 10E3/UL (ref 4–11)
WBC URINE: <1 /HPF

## 2025-07-17 PROCEDURE — 36415 COLL VENOUS BLD VENIPUNCTURE: CPT | Performed by: EMERGENCY MEDICINE

## 2025-07-17 PROCEDURE — 82805 BLOOD GASES W/O2 SATURATION: CPT | Performed by: EMERGENCY MEDICINE

## 2025-07-17 PROCEDURE — 82010 KETONE BODYS QUAN: CPT | Performed by: EMERGENCY MEDICINE

## 2025-07-17 PROCEDURE — 84484 ASSAY OF TROPONIN QUANT: CPT | Performed by: EMERGENCY MEDICINE

## 2025-07-17 PROCEDURE — 250N000012 HC RX MED GY IP 250 OP 636 PS 637: Performed by: EMERGENCY MEDICINE

## 2025-07-17 PROCEDURE — 99284 EMERGENCY DEPT VISIT MOD MDM: CPT | Mod: 25 | Performed by: EMERGENCY MEDICINE

## 2025-07-17 PROCEDURE — 96360 HYDRATION IV INFUSION INIT: CPT

## 2025-07-17 PROCEDURE — 96372 THER/PROPH/DIAG INJ SC/IM: CPT | Mod: XS

## 2025-07-17 PROCEDURE — 82962 GLUCOSE BLOOD TEST: CPT

## 2025-07-17 PROCEDURE — T1013 SIGN LANG/ORAL INTERPRETER: HCPCS | Mod: TEL,95

## 2025-07-17 PROCEDURE — 80048 BASIC METABOLIC PNL TOTAL CA: CPT | Performed by: EMERGENCY MEDICINE

## 2025-07-17 PROCEDURE — 81001 URINALYSIS AUTO W/SCOPE: CPT | Performed by: EMERGENCY MEDICINE

## 2025-07-17 PROCEDURE — 96361 HYDRATE IV INFUSION ADD-ON: CPT

## 2025-07-17 PROCEDURE — 258N000003 HC RX IP 258 OP 636: Performed by: EMERGENCY MEDICINE

## 2025-07-17 PROCEDURE — 85004 AUTOMATED DIFF WBC COUNT: CPT | Performed by: EMERGENCY MEDICINE

## 2025-07-17 PROCEDURE — 93005 ELECTROCARDIOGRAM TRACING: CPT | Performed by: EMERGENCY MEDICINE

## 2025-07-17 RX ORDER — GUAIFENESIN 200 MG/1
200 TABLET ORAL EVERY 4 HOURS PRN
COMMUNITY
Start: 2025-06-23

## 2025-07-17 RX ORDER — NITROGLYCERIN 0.4 MG/1
0.4 TABLET SUBLINGUAL EVERY 5 MIN PRN
COMMUNITY

## 2025-07-17 RX ORDER — MULTIVITAMIN/IRON/FOLIC ACID 18MG-0.4MG
1 TABLET ORAL DAILY
COMMUNITY
Start: 2025-06-20

## 2025-07-17 RX ADMIN — SODIUM CHLORIDE 500 ML: 0.9 INJECTION, SOLUTION INTRAVENOUS at 15:09

## 2025-07-17 RX ADMIN — INSULIN ASPART 8 UNITS: 100 INJECTION, SOLUTION INTRAVENOUS; SUBCUTANEOUS at 15:09

## 2025-07-17 RX ADMIN — SODIUM CHLORIDE 500 ML: 0.9 INJECTION, SOLUTION INTRAVENOUS at 13:23

## 2025-07-17 RX ADMIN — INSULIN ASPART 14 UNITS: 100 INJECTION, SOLUTION INTRAVENOUS; SUBCUTANEOUS at 14:23

## 2025-07-17 ASSESSMENT — ACTIVITIES OF DAILY LIVING (ADL)
ADLS_ACUITY_SCORE: 44

## 2025-07-17 NOTE — MEDICATION SCRIBE - ADMISSION MEDICATION HISTORY
Medication Scribe Admission Medication History    Admission medication history is complete. The information provided in this note is only as accurate as the sources available at the time of the update.    Information Source(s): Family member and CareEverywhere/SureScripts via in-person    Pertinent Information: Patient's medications are being managed by a group of people. Once a week on Thursday, a Home care Nurse comes into patient's home and sets up a weekly pill tray. Patient's son and spouse managed the Insulins. Patient managed the PRNs    Called Home Care RN @ [phone] and left message for call back. (No call back received). Son, Mariusz @ [phone], was present and gave report. Patient reported to have been given Insulin in the past 24 hours. Patient also reported to have taken medications in the past 24 hours. Patient and son did not know names/dose amounts/uses of medications. PTA list was formed by Sure Script data and some verbal reports. Medications were marked as taking with an unknown time.     Changes made to PTA medication list:  Added: Nitroglycerin, Guaifenesin  Deleted: None  Changed:  Ocean from QID to QID PRN (per son)  Novolog from 14 to 10 (per son)  Lantus AM from 34 to 28 (per son)    Allergies reviewed with patient and updates made in EHR: yes    Medication History Completed By: Royce Heath 7/17/2025 6:03 PM    PTA Med List   Medication Sig Last Dose/Taking    acetaminophen (TYLENOL) 650 MG CR tablet Take 650 mg by mouth every 8 hours as needed for pain Taking As Needed    amLODIPine (NORVASC) 5 MG tablet Take 1 tablet (5 mg) by mouth daily. Unknown    aspirin 81 MG EC tablet Take 81 mg by mouth daily  Unknown    CERTAVITE/ANTIOXIDANTS tablet Take 1 tablet by mouth daily. Unknown    cetirizine (ZYRTEC) 10 MG tablet Take 10 mg by mouth daily. Unknown    cholecalciferol (VITAMIN D3) 125 mcg (5000 units) capsule Take 125 mcg by mouth daily. Unknown    fluticasone (FLONASE) 50 MCG/ACT  nasal spray Spray 1 spray into both nostrils daily Unknown    gabapentin (NEURONTIN) 100 MG capsule Take 100 mg by mouth 3 times daily Unknown    guaiFENesin (ORGANIDIN) 200 MG tablet Take 200 mg by mouth every 4 hours as needed. Taking As Needed    insulin aspart (NOVOLOG FLEXPEN) 100 UNIT/ML pen Inject 10 Units subcutaneously 2 times daily (with meals). 7/16/2025 Bedtime    insulin glargine (LANTUS PEN) 100 UNIT/ML pen Inject 16 Units subcutaneously at bedtime. 7/16/2025 Bedtime    insulin glargine (LANTUS PEN) 100 UNIT/ML pen Inject 38 Units subcutaneously every morning. 7/17/2025 Morning    metFORMIN (GLUCOPHAGE-XR) 750 MG 24 hr tablet Take 750 mg by mouth 2 times daily (with meals)  Unknown    nitroGLYcerin (NITROSTAT) 0.4 MG sublingual tablet Place 0.4 mg under the tongue every 5 minutes as needed. Taking As Needed    Omega-3 Fatty Acids (FISH OIL) 1200 MG capsule Take 1,200 mg by mouth 2 times daily. Unknown    polyethylene glycol (MIRALAX) 17 GM/Dose powder Take 1 capful by mouth daily as needed for constipation  Taking As Needed    pravastatin (PRAVACHOL) 20 MG tablet Take 20 mg by mouth daily  Unknown    sitagliptin (JANUVIA) 50 MG tablet Take 50 mg by mouth daily. Unknown    sodium chloride (DEEP SEA NASAL SPRAY) 0.65 % nasal spray Spray 2 sprays in nostril 4 times daily as needed. Taking As Needed

## 2025-07-17 NOTE — ED NOTES
"Writer witnessed son holding patient up in triage exit.  Pt's legs gave out but he did not fall.  Son stated \"He says his legs don't work.\"  Pt assisted into wheelchair.  Blood sugar checked at 165.  BP 88/66.  Discussed with pt, son, MD, and charge RN and pt taken back to a bed for further observation.  Pt states \"I am fine, I can do it.\"  But son reports concerns bringing him home in this condition.    "

## 2025-07-17 NOTE — ED PROVIDER NOTES
EMERGENCY DEPARTMENT ENCOUNTER     NAME: Dee Sheikh   AGE: 85 year old male   YOB: 1940   MRN: [id number]   EVALUATION DATE & TIME: 7/17/2025 12:59 PM   PCP: Cathy Caballero A     Chief Complaint   Patient presents with    Hyperglycemia   :    FINAL IMPRESSION       1. Hyperglycemia due to diabetes mellitus (H)           ED COURSE & MEDICAL DECISION MAKING      Pertinent Labs & Imaging studies reviewed. (See chart for details)   85 year old male  presents to the Emergency Department for evaluation of hyperglycemia. Initial Vitals Reviewed. Initial exam notable for nontoxic patient who has normal vitals and is otherwise asymptomatic.  History was difficult to obtain, but it sounds like he was wearing his continuous glucose monitor and took it off overnight due to some itching, and then today family checked sugars at some point and found it to be elevated.  Initially he stated that he did take his morning insulin, but I also see that he is supposed to take aspart with meals and I suspect some noncompliance with sugar this high.  I did do a workup to evaluate for HHNK, DKA.  Fortunately, workup reveals isolated hyperglycemia with no severe dehydration, no acidosis, and minimal to no ketosis.  We are giving some additional insulin and IV fluids and will discharge when blood sugar has stabilized.           At the conclusion of the encounter I discussed the results of all of the tests and the disposition. The questions were answered. The patient or family acknowledged understanding and was agreeable with the care plan.     0 minutes critical care time, see procedure note below for details if relevant    Medical Decision Making  I obtained history from EMS  I reviewed the EMR: Outpatient Record: Last visit with pharmacy related to diabetes 6/4/2025  Care impacted by Diabetes  Discharge. I recommended the patient continue their current prescription strength medication(s): insulin. I considered admission, but  "ultimately discharged patient with reassuring evaluation.    MIPS (CTPE, Dental pain, Gillespie, Sinusitis, Asthma/COPD, Head Trauma): Not Applicable    SEPSIS: None                      MEDICATIONS GIVEN IN THE EMERGENCY:   Medications - No data to display   NEW PRESCRIPTIONS STARTED AT TODAY'S ER VISIT   New Prescriptions    No medications on file     ================================================================   HISTORY OF PRESENT ILLNESS       Patient information was obtained from: EMS   Use of Intrepreter: Yes (MARTII) - Marcela Sheikh is a 85 year old male with history of DMII with ketoacidosis and coma, LU, CKD4, diabetic peripheral neuropathy, hypertension, medication noncompliance, memory changes who presents by ambulance for evaluation of hyperglycemia.    Per EMS, patient is a Type 2 diabetic and his family checked his blood sugar and got over 600. Took insulin today but unsure how much. He seems lethargic but is becoming more alert. Given 250 of fluids. There were no other concerns/complaints at this time.      Per nurse, patient took his Amina off last night because it was \"itchy\".    ================================================================        PAST HISTORY     PAST MEDICAL HISTORY:   No past medical history on file.   PAST SURGICAL HISTORY:   Past Surgical History:   Procedure Laterality Date    NO PAST SURGERIES        CURRENT MEDICATIONS:   acetaminophen (TYLENOL) 650 MG CR tablet  amLODIPine (NORVASC) 5 MG tablet  aspirin 81 MG EC tablet  blood glucose monitoring (NO BRAND SPECIFIED) meter device kit  cetirizine (ZYRTEC) 10 MG tablet  cholecalciferol (VITAMIN D3) 125 mcg (5000 units) capsule  fluticasone (FLONASE) 50 MCG/ACT nasal spray  gabapentin (NEURONTIN) 100 MG capsule  insulin aspart (NOVOLOG FLEXPEN) 100 UNIT/ML pen  insulin glargine (LANTUS PEN) 100 UNIT/ML pen  insulin glargine (LANTUS PEN) 100 UNIT/ML pen  metFORMIN (GLUCOPHAGE-XR) 750 MG 24 hr tablet  Multiple " "Vitamins-Minerals (MULTIVITAMIN ADULTS PO)  Omega-3 Fatty Acids (FISH OIL) 1200 MG capsule  polyethylene glycol (MIRALAX) 17 GM/Dose powder  pravastatin (PRAVACHOL) 20 MG tablet  sitagliptin (JANUVIA) 50 MG tablet  sodium chloride (DEEP SEA NASAL SPRAY) 0.65 % nasal spray      ALLERGIES:   Allergies   Allergen Reactions    Dulaglutide      Other Reaction(s): weight loss, malaise    Losartan      Other Reaction(s): paresthesias      FAMILY HISTORY:   No family history on file.   SOCIAL HISTORY:   Social History     Socioeconomic History    Marital status:    Tobacco Use    Smoking status: Never    Smokeless tobacco: Never   Vaping Use    Vaping status: Never Used   Substance and Sexual Activity    Alcohol use: No        VITALS  No data found.     ================================================================    PHYSICAL EXAM     VITAL SIGNS: /64   Pulse 79   Temp 97.9  F (36.6  C)   Resp 18   Ht 1.6 m (5' 3\")   Wt 59.9 kg (132 lb)   SpO2 94%   BMI 23.38 kg/m     Constitutional:  Awake, no acute distress   HENT:  Atraumatic, oropharynx without exudate or erythema, membranes moist  Lymph:  No adenopathy  Eyes: EOM intact, PERRL, no injection  Neck: Supple  Respiratory:  Clear to auscultation bilaterally, no wheezes or crackles   Cardiovascular:  Regular rate and rhythm, single S1 and S2   GI:  Soft, nontender, nondistended, no rebound or guarding   Musculoskeletal:  Moves all extremities, no lower extremity edema, no deformities    Skin:  Warm, dry  Neurologic:  Alert and oriented x3, no focal deficits noted       ================================================================  LAB       All pertinent labs reviewed and interpreted.   Labs Ordered and Resulted from Time of ED Arrival to Time of ED Departure   BLOOD GAS VENOUS - Abnormal       Result Value    pH Venous 7.32      pCO2 Venous 48      pO2 Venous 28      Bicarbonate Venous 25      Base Excess/Deficit Venous -1.6      FIO2 21      " Oxyhemoglobin Venous 48 (*)     O2 Sat, Venous 48.7 (*)    ROUTINE UA WITH MICROSCOPIC REFLEX TO CULTURE - Abnormal    Color Urine Light Yellow      Appearance Urine Clear      Glucose Urine >1000 (*)     Bilirubin Urine Negative      Ketones Urine Negative      Specific Gravity Urine 1.027      Blood Urine 0.06 mg/dL (*)     pH Urine 6.0      Protein Albumin Urine 100 (*)     Urobilinogen Urine Normal      Nitrite Urine Negative      Leukocyte Esterase Urine Negative      RBC Urine 1      WBC Urine <1      Squamous Epithelials Urine <1     KETONE BETA-HYDROXYBUTYRATE QUANTITATIVE, RAPID - Abnormal    Ketone (Beta-Hydroxybutyrate) Quantitative 0.38 (*)    CBC WITH PLATELETS AND DIFFERENTIAL - Abnormal    WBC Count 6.5      RBC Count 4.72      Hemoglobin 12.7 (*)     Hematocrit 36.5 (*)     MCV 77 (*)     MCH 26.9      MCHC 34.8      RDW 13.1      Platelet Count 145 (*)     % Neutrophils 61      % Lymphocytes 28      % Monocytes 8      % Eosinophils 3      % Basophils 1      % Immature Granulocytes 1      NRBCs per 100 WBC 0      Absolute Neutrophils 4.0      Absolute Lymphocytes 1.8      Absolute Monocytes 0.5      Absolute Eosinophils 0.2      Absolute Basophils 0.0      Absolute Immature Granulocytes 0.0      Absolute NRBCs 0.0     GLUCOSE BY METER - Abnormal    GLUCOSE BY METER POCT >600 (*)    BASIC METABOLIC PANEL - Abnormal    Sodium 129 (*)     Potassium 5.6 (*)     Chloride 95 (*)     Carbon Dioxide (CO2) 24      Anion Gap 10      Urea Nitrogen 39.3 (*)     Creatinine 2.13 (*)     GFR Estimate 30 (*)     Calcium 8.7 (*)     Glucose 669 (*)    GLUCOSE MONITOR NURSING POCT        ===============================================================  RADIOLOGY       Reviewed all pertinent imaging. Please see official radiology report.   No orders to display         ================================================================  EKG         I have independently reviewed and interpreted the EKG(s) documented above.      ================================================================  PROCEDURES         I, Cami Caballero, am serving as a scribe to document services personally performed by Dr. Saldaña based on my observation and the provider's statements to me. I, Leslie Saldaña MD attest that Cami Caballero is acting in a scribe capacity, has observed my performance of the services and has documented them in accordance with my direction.   Leslie Saldaña M.D.   Emergency Medicine   [facility] EMERGENCY DEPARTMENT  [address] [place] [id number]  [phone]  Dept: [phone]      Leslie Saldaña MD  07/17/25 8762

## 2025-07-17 NOTE — ED NOTES
Patient asleep when writer went in- awakes to voice.  Informed patient he was ready for discharge to home- he was grateful.  Asked if patient had a ride- states he left his phone at home and does not know any numbers.  With permission, gave call to patient's son, Mariusz for ride home.  He verbalized he just got home from work and would be on the way to  his father.  Paperwork reviewed with patient and [facility] [place] [place] .  All questions answered.  SBA to wheelchair and brought out to lobby to wait for ride home.

## 2025-07-17 NOTE — ED NOTES
EMERGENCY DEPARTMENT SIGN OUT NOTE        ED COURSE AND MEDICAL DECISION MAKING  Patient was signed out to me by Dr Leslie Phillips at 3:07 PM  4:36 PM I rechecked and updated patient. I also discussed discharge plans with him.     In brief, Dee Sheikh is a 85 year old male who initially presented to the ED for evaluation of hyperglycemia. Patient is type 2 diabetic and family note his blood sugar was over 600 today. Patient took insulin, but is unsure of the amount taken.  He also appears lethargic. No other medical concerns at this time.     At time of sign out, disposition was pending if glucose is below 400 or when blood sugar has stabilized, we can discharge.  Blood glucose has improved below 400, and therefore will discharge as planned by the previous provider    FINAL IMPRESSION    1. Hyperglycemia due to diabetes mellitus (H)        ED MEDS  Medications   sodium chloride 0.9% BOLUS 500 mL (0 mLs Intravenous Stopped 7/17/25 1405)   insulin aspart (NovoLOG) injection (RAPID ACTING) (14 Units Subcutaneous $Given 7/17/25 1423)   sodium chloride 0.9% BOLUS 500 mL (0 mLs Intravenous Stopped 7/17/25 1640)   insulin aspart (NovoLOG) injection (RAPID ACTING) (8 Units Subcutaneous $Given 7/17/25 1509)       LAB  Labs Ordered and Resulted from Time of ED Arrival to Time of ED Departure   BLOOD GAS VENOUS - Abnormal       Result Value    pH Venous 7.32      pCO2 Venous 48      pO2 Venous 28      Bicarbonate Venous 25      Base Excess/Deficit Venous -1.6      FIO2 21      Oxyhemoglobin Venous 48 (*)     O2 Sat, Venous 48.7 (*)    ROUTINE UA WITH MICROSCOPIC REFLEX TO CULTURE - Abnormal    Color Urine Light Yellow      Appearance Urine Clear      Glucose Urine >1000 (*)     Bilirubin Urine Negative      Ketones Urine Negative      Specific Gravity Urine 1.027      Blood Urine 0.06 mg/dL (*)     pH Urine 6.0      Protein Albumin Urine 100 (*)     Urobilinogen Urine Normal      Nitrite Urine Negative      Leukocyte Esterase  Urine Negative      RBC Urine 1      WBC Urine <1      Squamous Epithelials Urine <1     KETONE BETA-HYDROXYBUTYRATE QUANTITATIVE, RAPID - Abnormal    Ketone (Beta-Hydroxybutyrate) Quantitative 0.38 (*)    CBC WITH PLATELETS AND DIFFERENTIAL - Abnormal    WBC Count 6.5      RBC Count 4.72      Hemoglobin 12.7 (*)     Hematocrit 36.5 (*)     MCV 77 (*)     MCH 26.9      MCHC 34.8      RDW 13.1      Platelet Count 145 (*)     % Neutrophils 61      % Lymphocytes 28      % Monocytes 8      % Eosinophils 3      % Basophils 1      % Immature Granulocytes 1      NRBCs per 100 WBC 0      Absolute Neutrophils 4.0      Absolute Lymphocytes 1.8      Absolute Monocytes 0.5      Absolute Eosinophils 0.2      Absolute Basophils 0.0      Absolute Immature Granulocytes 0.0      Absolute NRBCs 0.0     GLUCOSE BY METER - Abnormal    GLUCOSE BY METER POCT >600 (*)    BASIC METABOLIC PANEL - Abnormal    Sodium 129 (*)     Potassium 5.6 (*)     Chloride 95 (*)     Carbon Dioxide (CO2) 24      Anion Gap 10      Urea Nitrogen 39.3 (*)     Creatinine 2.13 (*)     GFR Estimate 30 (*)     Calcium 8.7 (*)     Glucose 669 (*)    GLUCOSE BY METER - Abnormal    GLUCOSE BY METER POCT 553 (*)    GLUCOSE BY METER - Abnormal    GLUCOSE BY METER POCT 377 (*)    GLUCOSE MONITOR NURSING POCT         RADIOLOGY    No orders to display       DISCHARGE MEDS  Discharge Medication List as of 7/17/2025  4:58 PM            BIANCA GlasgowO.   [facility] EMERGENCY DEPARTMENT  [address] [place] [id number]  [phone]         Kenny Walker,   07/17/25 9518      Addendum:    The patient was leaving the emergency department, he became suddenly weak and nearly collapsed to the floor.  Did not lose consciousness.  He was brought back to the emergency department for repeat evaluation.  Patient was fairly asymptomatic while in the bed and was requesting discharge.  After I discussed with them they did agree to have an  EKG performed, as well as a few additional blood test.  Fortunately EKG did not show any evidence of ischemia arrhythmia, and 2 troponins are both negative.  I am still concerned about the patient's sudden weakness, and the fact that his blood pressure did drop somewhat  as he was leaving the hospital.  Therefore I did discuss with them the potential for being admitted for further monitoring, however the patient is requesting discharge.  I encouraged the family to return to the emergency department if he had any additional episodes, or he changed his mind but otherwise we will discharge as the patient is requesting    LABS  Recent Results (from the past 24 hours)   Glucose by meter   Result Value Ref Range    GLUCOSE BY METER POCT >600 (HH) 70 - 99 mg/dL   CBC with platelets differential    Narrative    The following orders were created for panel order CBC with platelets differential.  Procedure                               Abnormality         Status                     ---------                               -----------         ------                     CBC with platelets and ...[[id number]]  Abnormal            Final result                 Please view results for these tests on the individual orders.   Ketone Beta-Hydroxybutyrate Quantitative   Result Value Ref Range    Ketone (Beta-Hydroxybutyrate) Quantitative 0.38 (H) <=0.30 mmol/L   [place] Draw    Narrative    The following orders were created for panel order [place] Draw.  Procedure                               Abnormality         Status                     ---------                               -----------         ------                     Extra Blue Top Tube[[id number]]                             Final result               Extra Red Top Tube[[id number]]                              Final result                 Please view results for these tests on the individual orders.   CBC with platelets and differential   Result Value Ref Range    WBC Count 6.5  4.0 - 11.0 10e3/uL    RBC Count 4.72 4.40 - 5.90 10e6/uL    Hemoglobin 12.7 (L) 13.3 - 17.7 g/dL    Hematocrit 36.5 (L) 40.0 - 53.0 %    MCV 77 (L) 78 - 100 fL    MCH 26.9 26.5 - 33.0 pg    MCHC 34.8 31.5 - 36.5 g/dL    RDW 13.1 10.0 - 15.0 %    Platelet Count 145 (L) 150 - 450 10e3/uL    % Neutrophils 61 %    % Lymphocytes 28 %    % Monocytes 8 %    % Eosinophils 3 %    % Basophils 1 %    % Immature Granulocytes 1 %    NRBCs per 100 WBC 0 <1 /100    Absolute Neutrophils 4.0 1.6 - 8.3 10e3/uL    Absolute Lymphocytes 1.8 0.8 - 5.3 10e3/uL    Absolute Monocytes 0.5 0.0 - 1.3 10e3/uL    Absolute Eosinophils 0.2 0.0 - 0.7 10e3/uL    Absolute Basophils 0.0 0.0 - 0.2 10e3/uL    Absolute Immature Granulocytes 0.0 <=0.4 10e3/uL    Absolute NRBCs 0.0 10e3/uL   Extra Blue Top Tube   Result Value Ref Range    Hold Specimen JIC    Extra Red Top Tube   Result Value Ref Range    Hold Specimen JI    Basic metabolic panel   Result Value Ref Range    Sodium 129 (L) 135 - 145 mmol/L    Potassium 5.6 (H) 3.4 - 5.3 mmol/L    Chloride 95 (L) 98 - 107 mmol/L    Carbon Dioxide (CO2) 24 22 - 29 mmol/L    Anion Gap 10 7 - 15 mmol/L    Urea Nitrogen 39.3 (H) 8.0 - 23.0 mg/dL    Creatinine 2.13 (H) 0.67 - 1.17 mg/dL    GFR Estimate 30 (L) >60 mL/min/1.73m2    Calcium 8.7 (L) 8.8 - 10.4 mg/dL    Glucose 669 (HH) 70 - 99 mg/dL   Troponin T, High Sensitivity   Result Value Ref Range    Troponin T, High Sensitivity 29 (H) <=22 ng/L   UA with Microscopic reflex to Culture    Specimen: Urine, Clean Catch   Result Value Ref Range    Color Urine Light Yellow Colorless, Straw, Light Yellow, Yellow    Appearance Urine Clear Clear    Glucose Urine >1000 (A) Negative mg/dL    Bilirubin Urine Negative Negative    Ketones Urine Negative Negative mg/dL    Specific Gravity Urine 1.027 1.001 - 1.030    Blood Urine 0.06 mg/dL (A) Negative    pH Urine 6.0 5.0 - 7.0    Protein Albumin Urine 100 (A) Negative mg/dL    Urobilinogen Urine Normal Normal mg/dL     Nitrite Urine Negative Negative    Leukocyte Esterase Urine Negative Negative    RBC Urine 1 <=2 /HPF    WBC Urine <1 <=5 /HPF    Squamous Epithelials Urine <1 <=1 /HPF    Narrative    Urine Culture not indicated   Blood gas venous   Result Value Ref Range    pH Venous 7.32 7.32 - 7.43    pCO2 Venous 48 40 - 50 mm Hg    pO2 Venous 28 25 - 47 mm Hg    Bicarbonate Venous 25 21 - 28 mmol/L    Base Excess/Deficit Venous -1.6 -3.0 - 3.0 mmol/L    FIO2 21     Oxyhemoglobin Venous 48 (L) 70 - 75 %    O2 Sat, Venous 48.7 (L) 70.0 - 75.0 %    Narrative    In healthy individuals, oxyhemoglobin (O2Hb) and oxygen saturation (SO2) are approximately equal. In the presence of dyshemoglobins, oxyhemoglobin can be considerably lower than oxygen saturation.   Glucose by meter   Result Value Ref Range    GLUCOSE BY METER POCT 553 (HH) 70 - 99 mg/dL   Glucose by meter   Result Value Ref Range    GLUCOSE BY METER POCT 377 (H) 70 - 99 mg/dL   Glucose by meter   Result Value Ref Range    GLUCOSE BY METER POCT 165 (H) 70 - 99 mg/dL   Troponin T, High Sensitivity   Result Value Ref Range    Troponin T, High Sensitivity 34 (H) <=22 ng/L   Troponin T, High Sensitivity   Result Value Ref Range    Troponin T, High Sensitivity 35 (H) <=22 ng/L         EKG    Rate: 87 bpm  Rhythm: Normal Sinus Rhythm  Axis: Normal  Interval: Normal  Conduction: Normal  QRS: Normal  ST: Normal  T-wave: Normal  QT: Not prolonged  Comparison EKG: Patient is no longer in second-degree type I block, otherwise no significant change when compared to 18 February 2025  Impression:  No acute ischemic change   I have independently reviewed and interpreted today's EKG, pending Cardiologist read      Final Impression  1. Hyperglycemia due to diabetes mellitus (H)    2. Weakness               Kenny Walker DO  07/17/25 2123

## 2025-07-17 NOTE — ED NOTES
Bed: JNED-03  Expected date:   Expected time:   Means of arrival: Ambulance  Comments:  SPF: 85m hyperglycemia, IVF, VSS, took insulin today, BG > 600

## 2025-07-17 NOTE — ED TRIAGE NOTES
Pt arrives via [facility]. Per medics - pt took his insulin this morning but his BG is reading >600. Medics placed an 18G IV in pt's left AC and gave 500ml normal saline. Medics state he is lethargic.    Pt reports that his kyler monitor was giving him a rash so he took it off last night. Pt reports being confused and extremely fatigue.       used.      Triage Assessment (Adult)       Row Name 07/17/25 7367          Triage Assessment    Airway WDL WDL        Respiratory WDL    Respiratory WDL WDL        Skin Circulation/Temperature WDL    Skin Circulation/Temperature WDL WDL        Cardiac WDL    Cardiac WDL WDL        Peripheral/Neurovascular WDL    Peripheral Neurovascular WDL WDL        Cognitive/Neuro/Behavioral WDL    Cognitive/Neuro/Behavioral WDL WDL

## 2025-07-21 LAB
ATRIAL RATE - MUSE: 87 BPM
DIASTOLIC BLOOD PRESSURE - MUSE: 63 MMHG
INTERPRETATION ECG - MUSE: NORMAL
P AXIS - MUSE: 69 DEGREES
PR INTERVAL - MUSE: 294 MS
QRS DURATION - MUSE: 88 MS
QT - MUSE: 354 MS
QTC - MUSE: 425 MS
R AXIS - MUSE: 57 DEGREES
SYSTOLIC BLOOD PRESSURE - MUSE: 131 MMHG
T AXIS - MUSE: 72 DEGREES
VENTRICULAR RATE- MUSE: 87 BPM